# Patient Record
Sex: MALE | Race: WHITE | HISPANIC OR LATINO | Employment: FULL TIME | URBAN - METROPOLITAN AREA
[De-identification: names, ages, dates, MRNs, and addresses within clinical notes are randomized per-mention and may not be internally consistent; named-entity substitution may affect disease eponyms.]

---

## 2017-07-25 ENCOUNTER — OFFICE VISIT (OUTPATIENT)
Dept: URGENT CARE | Facility: CLINIC | Age: 27
End: 2017-07-25

## 2017-07-25 ENCOUNTER — NON-PROVIDER VISIT (OUTPATIENT)
Dept: URGENT CARE | Facility: CLINIC | Age: 27
End: 2017-07-25

## 2017-07-25 DIAGNOSIS — Z29.89 NEED FOR ISOLATION: ICD-10-CM

## 2017-07-25 DIAGNOSIS — Z01.89 RESPIRATORY CLEARANCE EXAMINATION, ENCOUNTER FOR: ICD-10-CM

## 2017-07-25 PROCEDURE — 94375 RESPIRATORY FLOW VOLUME LOOP: CPT | Performed by: PHYSICIAN ASSISTANT

## 2017-07-25 PROCEDURE — 8916 PR CLEARANCE ONLY: Performed by: PHYSICIAN ASSISTANT

## 2017-07-25 NOTE — MR AVS SNAPSHOT
Shaka Cabrera   2017 2:15 PM   Office Visit   MRN: [id number]    Department:  [facility]   Dept Phone:  [phone]    Description:  Male : 1990   Provider:  Spring Sarmiento PA-C           Reason for Visit     Other Mask Fit/ Respiratory Clearance      Allergies as of 2017     No Known Allergies      You were diagnosed with     Respiratory clearance examination, encounter for   [[id number]]         Basic Information     Date Of Birth Sex Race Ethnicity Preferred Language    1990 Male  or   Origin (Luxembourgish,Bruneian,Taiwanese,Asif, etc) English      Health Maintenance     Patient has no pending health maintenance at this time      Current Immunizations     No immunizations on file.      Below and/or attached are the medications your provider expects you to take. Review all of your home medications and newly ordered medications with your provider and/or pharmacist. Follow medication instructions as directed by your provider and/or pharmacist. Please keep your medication list with you and share with your provider. Update the information when medications are discontinued, doses are changed, or new medications (including over-the-counter products) are added; and carry medication information at all times in the event of emergency situations     Allergies:  No Known Allergies          Medications  Valid as of: 2017 -  2:44 PM    Generic Name Brand Name Tablet Size Instructions for use    Hydrocodone-Acetaminophen (Tab) VICODIN 5-500 MG Take 1-2 Tabs by mouth every four hours as needed for 20 doses.        .                 Medicines prescribed today were sent to:     None      Medication refill instructions:       If your prescription bottle indicates you have medication refills left, it is not necessary to call your provider’s office. Please contact your pharmacy and they will refill your medication.    If your prescription bottle indicates you do not  have any refills left, you may request refills at any time through one of the following ways: The online [employer]ologic system (except Urgent Care), by calling your provider’s office, or by asking your pharmacy to contact your provider’s office with a refill request. Medication refills are processed only during regular business hours and may not be available until the next business day. Your provider may request additional information or to have a follow-up visit with you prior to refilling your medication.   *Please Note: Medication refills are assigned a new Rx number when refilled electronically. Your pharmacy may indicate that no refills were authorized even though a new prescription for the same medication is available at the pharmacy. Please request the medicine by name with the pharmacy before contacting your provider for a refill.           [employer] Access Code: [email or web address]  Expires: 8/24/2017  2:44 PM    US [employer]ologic  A secure, online tool to manage your health information     [employer]’s US [employer]ologic® is a secure, online tool that connects you to your personalized health information from the privacy of your home -- day or night - making it very easy for you to manage your healthcare. Once the activation process is completed, you can even access your medical information using the US [employer]ologic dudley, which is available for free in the Apple Dudley store or Google Play store.     US [employer]ologic provides the following levels of access (as shown below):   My Chart Features   [facility]  Urgent  Care [facility]  Primary Care  Doctor   Email your healthcare team securely and privately 24/7 X X X    Manage appointments: schedule your next appointment; view details of past/upcoming appointments X      Request prescription refills. X      View recent personal medical records, including lab and immunizations X X X X   View health record, including health history, allergies, medications X X X X    Read reports about your outpatient visits, procedures, consult and ER notes X X X X   See your discharge summary, which is a recap of your hospital and/or ER visit that includes your diagnosis, lab results, and care plan. X X       How to register for WorkSimple:  1. Go to  [email or web address].  2. Click on the Sign Up Now box, which takes you to the New Member Sign Up page. You will need to provide the following information:  a. Enter your WorkSimple Access Code exactly as it appears at the top of this page. (You will not need to use this code after you’ve completed the sign-up process. If you do not sign up before the expiration date, you must request a new code.)   b. Enter your date of birth.   c. Enter your home email address.   d. Click Submit, and follow the next screen’s instructions.  3. Create a WorkSimple ID. This will be your WorkSimple login ID and cannot be changed, so think of one that is secure and easy to remember.  4. Create a WorkSimple password. You can change your password at any time.  5. Enter your Password Reset Question and Answer. This can be used at a later time if you forget your password.   6. Enter your e-mail address. This allows you to receive e-mail notifications when new information is available in WorkSimple.  7. Click Sign Up. You can now view your health information.    For assistance activating your WorkSimple account, call [phone]

## 2017-07-25 NOTE — PROGRESS NOTES
Shaka Cabrera is a 27 y.o. male here for a non-provider visit for Mask Fit/ Respiratory Clearance    If abnormal was an in office provider notified today (if so, indicate provider)? Yes  Routed to PCP? No    Respiratory Questionnaire reviewed. Patient cleared without restrictions

## 2018-03-22 ENCOUNTER — OFFICE VISIT (OUTPATIENT)
Dept: MEDICAL GROUP | Facility: PHYSICIAN GROUP | Age: 28
End: 2018-03-22
Payer: COMMERCIAL

## 2018-03-22 VITALS
WEIGHT: 264 LBS | OXYGEN SATURATION: 94 % | SYSTOLIC BLOOD PRESSURE: 138 MMHG | HEIGHT: 73 IN | HEART RATE: 82 BPM | DIASTOLIC BLOOD PRESSURE: 82 MMHG | TEMPERATURE: 97.5 F | BODY MASS INDEX: 34.99 KG/M2

## 2018-03-22 DIAGNOSIS — I10 ESSENTIAL HYPERTENSION: ICD-10-CM

## 2018-03-22 DIAGNOSIS — R06.83 SNORING: ICD-10-CM

## 2018-03-22 DIAGNOSIS — Z13.220 SCREENING FOR LIPID DISORDERS: ICD-10-CM

## 2018-03-22 DIAGNOSIS — F43.23 ADJUSTMENT REACTION WITH ANXIETY AND DEPRESSION: ICD-10-CM

## 2018-03-22 DIAGNOSIS — R06.89 DIFFICULTY BREATHING: ICD-10-CM

## 2018-03-22 DIAGNOSIS — Z76.89 ENCOUNTER TO ESTABLISH CARE: ICD-10-CM

## 2018-03-22 DIAGNOSIS — R68.89 NASAL AIRWAY ABNORMALITY: ICD-10-CM

## 2018-03-22 PROBLEM — R06.02 SHORTNESS OF BREATH: Status: ACTIVE | Noted: 2018-03-22

## 2018-03-22 PROBLEM — F41.9 ANXIETY: Status: ACTIVE | Noted: 2018-03-22

## 2018-03-22 PROCEDURE — 99214 OFFICE O/P EST MOD 30 MIN: CPT | Performed by: NURSE PRACTITIONER

## 2018-03-22 RX ORDER — FLUTICASONE PROPIONATE 50 MCG
2 SPRAY, SUSPENSION (ML) NASAL DAILY
Qty: 16 G | Refills: 0 | Status: SHIPPED | OUTPATIENT
Start: 2018-03-22 | End: 2019-11-29

## 2018-03-22 ASSESSMENT — PATIENT HEALTH QUESTIONNAIRE - PHQ9
5. POOR APPETITE OR OVEREATING: 2 - MORE THAN HALF THE DAYS
SUM OF ALL RESPONSES TO PHQ QUESTIONS 1-9: 12
CLINICAL INTERPRETATION OF PHQ2 SCORE: 4

## 2018-03-22 NOTE — PROGRESS NOTES
Chief Complaint   Patient presents with   • Weight Gain     x1year, would thyroid check   • Shortness of Breath     has trouble breathing really easy       HPI:    Shaka Cabrera is a 27 y.o. male here to establish care. Here with his s/o today     BMI 34.0-34.9,adult  States he has gained 50 lb over the past year.     Exercise: 3-4 days/week weight lifting and then with 45 minutes total cardio in a week    Nutrition: 24 hour diet recall: [employer] burrito/fries, and lunchable  Wife often meal preps for him, so he generally eats chicken, rice, veggies       Difficulty breathing  New problem. Started about 1 year ago. Feels like he cannot breathe through the left nostril, therefore then feels like he is not getting enough air. When walking upstairs he notices increased sob. I clarified with him multiple times whether he feels any difficulty getting air through lungs when breathing through mouth, or chest tightness or dyspnea, and he states no. States he feels the difficulty breathing comes from his left nare. Wife reports he does snore loudly. No hx of asthma or smoking.     Adjustment reaction with anxiety and depression  New problem started about 1 year ago. He got a new job at [employer] and started school. He is  with 2 kids at home. He has a lot going on. Will be in school for another 3-4 years at least while working full time.   He is getting headaches nearly every other day with pain in frontal head and sometime occipital region. They are occurring randomly. Sometimes he can wake up with a bad headache.     Hypertension  At physicals his bp is 130s/80. This is ongoing for him. No family hx of HTN, but his mom has kidney failure and is on dialysis    Current medicines (including changes today)  Current Outpatient Prescriptions   Medication Sig Dispense Refill   • fluticasone (FLONASE) 50 MCG/ACT nasal spray Spray 2 Sprays in nose every day. 16 g 0     No current facility-administered medications for this  "visit.      He  has no past medical history of Asthma.  He  has a past surgical history that includes debridement (08) and finger amputation (08).  Social History   Substance Use Topics   • Smoking status: Never Smoker   • Smokeless tobacco: Never Used   • Alcohol use Not on file     Social History     Social History Narrative    Works full time at [employer]. In school full time working on [employer] degree     Family History   Problem Relation Age of Onset   • Kidney Disease Mother    • Thyroid Father    • Arthritis Father      gout   • Diabetes Maternal Grandmother    • Diabetes Paternal Grandfather    • Cancer Neg Hx    • Heart Disease Neg Hx    • Stroke Neg Hx    • Hypertension Neg Hx    • Hyperlipidemia Neg Hx      Family Status   Relation Status   • Mother Alive   • Father Alive   • Maternal Grandmother    • Paternal Grandfather    • Neg Hx      Health Maintenance Topics with due status: Overdue       Topic Date Due    IMM DTaP/Tdap/Td Vaccine 2007    IMM INFLUENZA 2017        ROS  Constitutional: +fatigue and insomnia. +weight gain. No F/C, night sweats  Head/Neck: + headache. No dizziness or neck pain   Eyes: No change in vision  Nose: No discharge, sinus pain, nosebleeds, allergies  Lungs: see HPI. No cough, sob, dyspnea, chest pain with breathing, wheezing, sputum  Cardiac: No chest pain  Endo: +weight gain. No heat or cold intolerance, change in skin/hair, or excessive fatigue     Objective:     Blood pressure 138/82, pulse 82, temperature 36.4 °C (97.5 °F), height 1.854 m (6' 1\"), weight 119.7 kg (264 lb), SpO2 94 %. Body mass index is 34.83 kg/m².  Physical Exam:  Alert, oriented in no acute distress.  Eye contact is good, speech goal directed, affect bright.  HEENT: EOMI, conjunctiva non-injected, sclera non-icteric. No lid edema or eye drainage  Nares patent with no significant congestion or drainage. No deviated septum. No sign of nasal obstruction.   Gross " hearing intact   Neck: supple with no cervical or supraclavicular lymphadenopathy, palpable thyroid nodules or thyromegaly.  Lungs: unlabored, clear to auscultation bilaterally with good excursion.  CV: regular rate and rhythm, no murmurs, no carotid bruits  Lower extremities color normal, vascularity normal, no edema  Skin: No rashes or lesions in visible areas  Neuro: CNs grossly intact. Gait steady. Strength all extremities 5/5.         Assessment and Plan:   Assessment/Plan:  1. Encounter to establish care    2. Essential hypertension  New problem. Enc weight loss. Start monitoring bp weekly. F/u 6 months   - COMP METABOLIC PANEL; Future  - TSH WITH REFLEX TO FT4; Future    3. Adjustment reaction with anxiety and depression  Not controlled. Discussed stress management techniques, sleep hygiene, and enc him to notify me if this starts affecting his ADLs  - Patient has been identified as being depressed and appropriate orders and counseling have been given    4. Difficulty breathing  See #5-6. Not performing xray as concerns are clearly coming from nasal passage   - CBC WITH DIFFERENTIAL; Future    5. Snoring  Start trial of mouthpiece at night. If no improvement, consider eval with ENT d/t additional c/o nasal blockage   - REFERRAL TO ENT    6. Nasal airway abnormality  No sign of obstruction or deviated septum today, but his complaint sounds like obstruction/blockage complaints. Start trial of Flonase. If not improving, f/u with ENT  - fluticasone (FLONASE) 50 MCG/ACT nasal spray; Spray 2 Sprays in nose every day.  Dispense: 16 g; Refill: 0  - REFERRAL TO ENT    7. BMI 34.0-34.9,adult  Ongoing despite his efforts. Enc increasing cardiovascular exercise and decreasing portions   - Patient identified as having weight management issue.  Appropriate orders and counseling given.    8. Screening for lipid disorders  - LIPID PROFILE; Future       Follow up:  Return pending labs .    Educated in proper administration of  medication(s) ordered today including safety, possible SE, risks, benefits, rationale and alternatives to therapy.   Supportive care, differential diagnoses, and indications for immediate follow-up discussed with patient.    Pathogenesis of diagnosis discussed including typical length and natural progression.    Instructed to return to clinic or nearest emergency department for any change in condition, further concerns, or worsening of symptoms.  Patient states understanding of the plan of care and discharge instructions.    Please note that this dictation was created using voice recognition software. I have made every reasonable attempt to correct obvious errors, but I expect that there are errors of grammar and possibly content that I did not discover before finalizing the note.    Followup: Return pending labs . sooner should new symptoms or problems arise.

## 2018-03-24 ENCOUNTER — HOSPITAL ENCOUNTER (OUTPATIENT)
Dept: LAB | Facility: MEDICAL CENTER | Age: 28
End: 2018-03-24
Attending: NURSE PRACTITIONER
Payer: COMMERCIAL

## 2018-03-24 DIAGNOSIS — I10 ESSENTIAL HYPERTENSION: ICD-10-CM

## 2018-03-24 DIAGNOSIS — R06.89 DIFFICULTY BREATHING: ICD-10-CM

## 2018-03-24 DIAGNOSIS — Z13.220 SCREENING FOR LIPID DISORDERS: ICD-10-CM

## 2018-03-24 LAB
ALBUMIN SERPL BCP-MCNC: 4.3 G/DL (ref 3.2–4.9)
ALBUMIN/GLOB SERPL: 1.7 G/DL
ALP SERPL-CCNC: 67 U/L (ref 30–99)
ALT SERPL-CCNC: 28 U/L (ref 2–50)
ANION GAP SERPL CALC-SCNC: 8 MMOL/L (ref 0–11.9)
AST SERPL-CCNC: 20 U/L (ref 12–45)
BASOPHILS # BLD AUTO: 0.6 % (ref 0–1.8)
BASOPHILS # BLD: 0.04 K/UL (ref 0–0.12)
BILIRUB SERPL-MCNC: 0.5 MG/DL (ref 0.1–1.5)
BUN SERPL-MCNC: 14 MG/DL (ref 8–22)
CALCIUM SERPL-MCNC: 9.3 MG/DL (ref 8.5–10.5)
CHLORIDE SERPL-SCNC: 107 MMOL/L (ref 96–112)
CHOLEST SERPL-MCNC: 143 MG/DL (ref 100–199)
CO2 SERPL-SCNC: 23 MMOL/L (ref 20–33)
CREAT SERPL-MCNC: 0.77 MG/DL (ref 0.5–1.4)
EOSINOPHIL # BLD AUTO: 0.26 K/UL (ref 0–0.51)
EOSINOPHIL NFR BLD: 4 % (ref 0–6.9)
ERYTHROCYTE [DISTWIDTH] IN BLOOD BY AUTOMATED COUNT: 40.3 FL (ref 35.9–50)
GLOBULIN SER CALC-MCNC: 2.6 G/DL (ref 1.9–3.5)
GLUCOSE SERPL-MCNC: 91 MG/DL (ref 65–99)
HCT VFR BLD AUTO: 49.9 % (ref 42–52)
HDLC SERPL-MCNC: 59 MG/DL
HGB BLD-MCNC: 16.1 G/DL (ref 14–18)
IMM GRANULOCYTES # BLD AUTO: 0.02 K/UL (ref 0–0.11)
IMM GRANULOCYTES NFR BLD AUTO: 0.3 % (ref 0–0.9)
LDLC SERPL CALC-MCNC: 71 MG/DL
LYMPHOCYTES # BLD AUTO: 2.21 K/UL (ref 1–4.8)
LYMPHOCYTES NFR BLD: 33.8 % (ref 22–41)
MCH RBC QN AUTO: 28.1 PG (ref 27–33)
MCHC RBC AUTO-ENTMCNC: 32.3 G/DL (ref 33.7–35.3)
MCV RBC AUTO: 87.2 FL (ref 81.4–97.8)
MONOCYTES # BLD AUTO: 0.44 K/UL (ref 0–0.85)
MONOCYTES NFR BLD AUTO: 6.7 % (ref 0–13.4)
NEUTROPHILS # BLD AUTO: 3.57 K/UL (ref 1.82–7.42)
NEUTROPHILS NFR BLD: 54.6 % (ref 44–72)
NRBC # BLD AUTO: 0 K/UL
NRBC BLD-RTO: 0 /100 WBC
PLATELET # BLD AUTO: 197 K/UL (ref 164–446)
PMV BLD AUTO: 11.4 FL (ref 9–12.9)
POTASSIUM SERPL-SCNC: 4 MMOL/L (ref 3.6–5.5)
PROT SERPL-MCNC: 6.9 G/DL (ref 6–8.2)
RBC # BLD AUTO: 5.72 M/UL (ref 4.7–6.1)
SODIUM SERPL-SCNC: 138 MMOL/L (ref 135–145)
TRIGL SERPL-MCNC: 65 MG/DL (ref 0–149)
TSH SERPL DL<=0.005 MIU/L-ACNC: 0.91 UIU/ML (ref 0.38–5.33)
WBC # BLD AUTO: 6.5 K/UL (ref 4.8–10.8)

## 2018-03-24 PROCEDURE — 85025 COMPLETE CBC W/AUTO DIFF WBC: CPT

## 2018-03-24 PROCEDURE — 36415 COLL VENOUS BLD VENIPUNCTURE: CPT

## 2018-03-24 PROCEDURE — 84443 ASSAY THYROID STIM HORMONE: CPT

## 2018-03-24 PROCEDURE — 80061 LIPID PANEL: CPT

## 2018-03-24 PROCEDURE — 80053 COMPREHEN METABOLIC PANEL: CPT

## 2019-11-29 ENCOUNTER — OFFICE VISIT (OUTPATIENT)
Dept: URGENT CARE | Facility: PHYSICIAN GROUP | Age: 29
End: 2019-11-29
Payer: COMMERCIAL

## 2019-11-29 VITALS
BODY MASS INDEX: 33.13 KG/M2 | HEIGHT: 73 IN | DIASTOLIC BLOOD PRESSURE: 80 MMHG | OXYGEN SATURATION: 95 % | WEIGHT: 250 LBS | HEART RATE: 105 BPM | RESPIRATION RATE: 20 BRPM | TEMPERATURE: 102 F | SYSTOLIC BLOOD PRESSURE: 134 MMHG

## 2019-11-29 DIAGNOSIS — R50.9 FEVER, UNSPECIFIED FEVER CAUSE: ICD-10-CM

## 2019-11-29 DIAGNOSIS — J11.1 INFLUENZA: ICD-10-CM

## 2019-11-29 LAB
FLUAV+FLUBV AG SPEC QL IA: NORMAL
INT CON NEG: NEGATIVE
INT CON NEG: NEGATIVE
INT CON POS: POSITIVE
INT CON POS: POSITIVE
S PYO AG THROAT QL: NEGATIVE

## 2019-11-29 PROCEDURE — 99214 OFFICE O/P EST MOD 30 MIN: CPT | Performed by: FAMILY MEDICINE

## 2019-11-29 PROCEDURE — 87804 INFLUENZA ASSAY W/OPTIC: CPT | Performed by: FAMILY MEDICINE

## 2019-11-29 PROCEDURE — 87880 STREP A ASSAY W/OPTIC: CPT | Performed by: FAMILY MEDICINE

## 2019-11-29 RX ORDER — OSELTAMIVIR PHOSPHATE 75 MG/1
75 CAPSULE ORAL 2 TIMES DAILY
Qty: 10 CAP | Refills: 0 | Status: SHIPPED | OUTPATIENT
Start: 2019-11-29 | End: 2020-02-28

## 2019-11-29 RX ORDER — ACETAMINOPHEN 500 MG
1000 TABLET ORAL ONCE
Status: COMPLETED | OUTPATIENT
Start: 2019-11-29 | End: 2019-11-29

## 2019-11-29 RX ADMIN — Medication 1000 MG: at 10:00

## 2019-11-29 NOTE — PROGRESS NOTES
"Subjective:      Shaak Cabrera is a 29 y.o. male who presents with Sore Throat (fever, congestion, cough, bodyaches x2days)            This is a new problem.  29-year-old otherwise healthy non-smoker presenting with flulike symptoms including cough congestion and sore throat and body aches for the past couple days.  He also has had fever.  Use some DayQuil this morning.  No wheezing or shortness of breath reported.      Review of Systems   All other systems reviewed and are negative.         Objective:     /80 (BP Location: Right arm, Patient Position: Sitting, BP Cuff Size: Large adult)   Pulse (!) 105   Temp (!) 38.9 °C (102 °F) (Temporal)   Resp 20   Ht 1.854 m (6' 1\")   Wt 113.4 kg (250 lb)   SpO2 95%   BMI 32.98 kg/m²      Physical Exam  Constitutional:       General: He is not in acute distress.     Appearance: Normal appearance. He is not toxic-appearing or diaphoretic.   HENT:      Head: Normocephalic and atraumatic.      Right Ear: External ear normal.      Left Ear: External ear normal.      Nose: Congestion present.      Mouth/Throat:      Mouth: Mucous membranes are moist. No oral lesions.      Pharynx: Uvula midline. Posterior oropharyngeal erythema present. No oropharyngeal exudate or uvula swelling.      Tonsils: No tonsillar exudate or tonsillar abscesses.   Eyes:      Conjunctiva/sclera: Conjunctivae normal.   Neck:      Musculoskeletal: Neck supple. No muscular tenderness.   Cardiovascular:      Rate and Rhythm: Regular rhythm.      Heart sounds: No murmur. No friction rub. No gallop.    Pulmonary:      Effort: Pulmonary effort is normal. No respiratory distress.      Breath sounds: No stridor. No wheezing, rhonchi or rales.   Lymphadenopathy:      Cervical: No cervical adenopathy.   Neurological:      Mental Status: He is alert.             Results for orders placed or performed in visit on 11/29/19   POCT Influenza A/B   Result Value Ref Range    Rapid Influenza A-B Positive B     " Internal Control Positive Positive     Internal Control Negative Negative    POCT Rapid Strep A   Result Value Ref Range    Rapid Strep Screen Negative     Internal Control Positive Positive     Internal Control Negative Negative           Assessment/Plan:   ASSESSMENT:PLAN:  1. Influenza  - oseltamivir (TAMIFLU) 75 MG Cap; Take 1 Cap by mouth 2 times a day.  Dispense: 10 Cap; Refill: 0    2. Fever, unspecified fever cause  - acetaminophen (TYLENOL) tablet 1,000 mg  - POCT Influenza A/B  - POCT Rapid Strep A    Patient preferred to have Tamiflu  Continue symptomatic care  Plan per orders and instructions  Warning signs reviewed

## 2020-02-28 ENCOUNTER — OFFICE VISIT (OUTPATIENT)
Dept: MEDICAL GROUP | Facility: MEDICAL CENTER | Age: 30
End: 2020-02-28
Payer: COMMERCIAL

## 2020-02-28 VITALS
RESPIRATION RATE: 18 BRPM | BODY MASS INDEX: 33.62 KG/M2 | SYSTOLIC BLOOD PRESSURE: 110 MMHG | HEART RATE: 86 BPM | TEMPERATURE: 97.2 F | DIASTOLIC BLOOD PRESSURE: 82 MMHG | WEIGHT: 262 LBS | OXYGEN SATURATION: 96 % | HEIGHT: 74 IN

## 2020-02-28 DIAGNOSIS — Z00.00 WELL ADULT EXAM: ICD-10-CM

## 2020-02-28 DIAGNOSIS — F43.23 ADJUSTMENT REACTION WITH ANXIETY AND DEPRESSION: ICD-10-CM

## 2020-02-28 DIAGNOSIS — R09.81 NASAL CONGESTION: ICD-10-CM

## 2020-02-28 DIAGNOSIS — R06.83 SNORING: ICD-10-CM

## 2020-02-28 DIAGNOSIS — E66.9 OBESITY (BMI 30.0-34.9): ICD-10-CM

## 2020-02-28 DIAGNOSIS — G43.009 MIGRAINE WITHOUT AURA AND WITHOUT STATUS MIGRAINOSUS, NOT INTRACTABLE: ICD-10-CM

## 2020-02-28 DIAGNOSIS — N52.8 OTHER MALE ERECTILE DYSFUNCTION: ICD-10-CM

## 2020-02-28 PROBLEM — I10 HYPERTENSION: Status: RESOLVED | Noted: 2018-03-22 | Resolved: 2020-02-28

## 2020-02-28 PROBLEM — N52.1 ERECTILE DYSFUNCTION DUE TO DISEASES CLASSIFIED ELSEWHERE: Status: ACTIVE | Noted: 2020-02-28

## 2020-02-28 PROBLEM — R06.89 DIFFICULTY BREATHING: Status: RESOLVED | Noted: 2018-03-22 | Resolved: 2020-02-28

## 2020-02-28 PROBLEM — E66.811 OBESITY (BMI 30.0-34.9): Status: ACTIVE | Noted: 2020-02-28

## 2020-02-28 PROCEDURE — 99204 OFFICE O/P NEW MOD 45 MIN: CPT | Performed by: INTERNAL MEDICINE

## 2020-02-28 RX ORDER — SUMATRIPTAN 50 MG/1
50 TABLET, FILM COATED ORAL
Qty: 12 TAB | Refills: 0 | Status: SHIPPED | OUTPATIENT
Start: 2020-02-28 | End: 2022-07-06

## 2020-02-28 RX ORDER — SILDENAFIL 50 MG/1
25-50 TABLET, FILM COATED ORAL PRN
Qty: 10 TAB | Refills: 1 | Status: SHIPPED
Start: 2020-02-28 | End: 2020-03-06 | Stop reason: SDUPTHER

## 2020-02-28 RX ORDER — AZELASTINE 1 MG/ML
1 SPRAY, METERED NASAL 2 TIMES DAILY
Qty: 30 ML | Refills: 1 | Status: SHIPPED | OUTPATIENT
Start: 2020-02-28 | End: 2020-04-20

## 2020-02-28 RX ORDER — FLUTICASONE PROPIONATE 50 MCG
1 SPRAY, SUSPENSION (ML) NASAL DAILY
Qty: 16 G | Refills: 1 | Status: SHIPPED | OUTPATIENT
Start: 2020-02-28 | End: 2022-07-06

## 2020-02-28 SDOH — HEALTH STABILITY: MENTAL HEALTH: HOW MANY STANDARD DRINKS CONTAINING ALCOHOL DO YOU HAVE ON A TYPICAL DAY?: 1 OR 2

## 2020-02-28 SDOH — HEALTH STABILITY: MENTAL HEALTH: HOW OFTEN DO YOU HAVE A DRINK CONTAINING ALCOHOL?: MONTHLY OR LESS

## 2020-02-28 ASSESSMENT — PATIENT HEALTH QUESTIONNAIRE - PHQ9
5. POOR APPETITE OR OVEREATING: 1 - SEVERAL DAYS
SUM OF ALL RESPONSES TO PHQ QUESTIONS 1-9: 13
CLINICAL INTERPRETATION OF PHQ2 SCORE: 1

## 2020-02-28 NOTE — ASSESSMENT & PLAN NOTE
5-6 times a week, 45 min- 1hr 15 min.   Trying lean meats and veges, falls off wagon sometimes.   Used to weigh 290 lbs, now 262 lbs, lost over a year.

## 2020-02-28 NOTE — ASSESSMENT & PLAN NOTE
Feels congested when he walks up stairs, difficult to breathe. Denies any nasal drip, wheezing. Tried flonase for a few weeks before.  Did not notice any difference.

## 2020-02-28 NOTE — PROGRESS NOTES
New Patient to Establish      CC: Sleep apnea, obesity, migraine, erectile dysfunction    HPI:   29 y.o. male came into clinic for above.      Other male erectile dysfunction  X a few years. Still having morning erection sometimes. Most difficulty with maintaining erection. No prior injury.     Obesity (BMI 30.0-34.9)  5-6 times a week, 45 min- 1hr 15 min.   Trying lean meats and veges, falls off wagon sometimes.   Used to weigh 290 lbs, now 262 lbs, lost over a year.    Migraine without aura and without status migrainosus, not intractable  X 10 years, 1-2 / week, back of head or front bilaterally, pounding. Lasts for hours.   Drinking more water helps decrease frequency.   Light sensitivity +.  No N/V    Adjustment reaction with anxiety and depression  Currently stressed and sometimes feel depressed due to school, mother's illness. Denies SI. Managing okay most of the time.     Nasal congestion  Denies any nasal drip, wheezing. Tried flonase for a few weeks before.  Did not notice any difference.    Snoring  Wife noted apneas during sleep, irregular breathing.      ROS  GERD +.   10 systems reviewed, negative except mentioned as above.      Patient Active Problem List    Diagnosis Date Noted   • Snoring 02/28/2020   • Migraine without aura and without status migrainosus, not intractable 02/28/2020   • Other male erectile dysfunction 02/28/2020   • Obesity (BMI 30.0-34.9) 02/28/2020   • Nasal congestion 02/28/2020   • Adjustment reaction with anxiety and depression 03/22/2018       History reviewed. No pertinent past medical history.    Current Outpatient Medications   Medication Sig Dispense Refill   • fluticasone (FLONASE) 50 MCG/ACT nasal spray Spray 1 Spray in nose every day. 16 g 1   • azelastine (ASTELIN) 137 MCG/SPRAY nasal spray [place] 1 Spray in nose 2 times a day. 30 mL 1   • SUMAtriptan (IMITREX) 50 MG Tab Take 1 Tab by mouth Once PRN for Migraine (may repeat one more dose after 1 hour if no relief) for up  to 1 dose. 12 Tab 0   • sildenafil citrate (VIAGRA) 50 MG tablet Take 0.5-1 Tabs by mouth as needed for Erectile Dysfunction. 10 Tab 1     No current facility-administered medications for this visit.        Allergies as of 02/28/2020   • (No Known Allergies)       Social History     Socioeconomic History   • Marital status:      Spouse name: Not on file   • Number of children: Not on file   • Years of education: Not on file   • Highest education level: Not on file   Occupational History   • Not on file   Social Needs   • Financial resource strain: Not on file   • Food insecurity     Worry: Not on file     Inability: Not on file   • Transportation needs     Medical: Not on file     Non-medical: Not on file   Tobacco Use   • Smoking status: Never Smoker   • Smokeless tobacco: Never Used   Substance and Sexual Activity   • Alcohol use: Yes     Frequency: Monthly or less     Drinks per session: 1 or 2     Comment: 1 beer / month   • Drug use: No   • Sexual activity: Yes     Partners: Female     Comment:    Lifestyle   • Physical activity     Days per week: Not on file     Minutes per session: Not on file   • Stress: Not on file   Relationships   • Social connections     Talks on phone: Not on file     Gets together: Not on file     Attends [employer] service: Not on file     Active member of club or organization: Not on file     Attends meetings of clubs or organizations: Not on file     Relationship status: Not on file   • Intimate partner violence     Fear of current or ex partner: Not on file     Emotionally abused: Not on file     Physically abused: Not on file     Forced sexual activity: Not on file   Other Topics Concern   • Not on file   Social History Narrative    Works full time at [employer]. 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       Family History   Problem Relation Age of Onset   • Kidney Disease Mother         dialysis at age 40s   • Sleep Apnea Mother    • Thyroid Father   "  • Arthritis Father         gout   • Sleep Apnea Father    • Diabetes Father    • Psychiatric Illness Father    • Diabetes Maternal Grandmother    • Diabetes Paternal Grandfather    • Cancer Neg Hx    • Heart Disease Neg Hx    • Stroke Neg Hx    • Hypertension Neg Hx    • Hyperlipidemia Neg Hx        Past Surgical History:   Procedure Laterality Date   • DEBRIDEMENT  8/11/08    Performed by HAVEN SHIRLEY at SUR[facility]ERY [facility]S   • FINGER AMPUTATION  8/11/08    Performed by HAVEN SHIRLEY at [facility] ORS. tips of right 3,4th fingers, numb at the tips, no function loss         /82 (BP Location: Right arm, Patient Position: Sitting, BP Cuff Size: Adult)   Pulse 86   Temp 36.2 °C (97.2 °F) (Temporal)   Resp 18   Ht 1.88 m (6' 2\")   Wt 118.8 kg (262 lb)   SpO2 96%   BMI 33.64 kg/m²     Physical Exam  General: Alert and oriented, No apparent distress.  Eyes: Pupils are equal and reactive. No scleral icterus.  Throat: Clear no erythema or exudates noted.  Nasal mucosa is red and inflamed.  Neck: Supple. No cervical or supraclavicular lymphadenopathy noted. Thyroid not enlarged.  Lungs: Clear to auscultation bilaterally without any wheezing, crepitations.  Cardiovascular: Regular rate and rhythm. No murmurs, rubs or gallops.  Abdomen: Bowel sound +, soft, non tender, no rebound or guarding, no palpable organomegaly  Extremities: No clubbing, cyanosis, edema.  Skin: No rash or suspicious skin lesions noted.  Neuro: A & O x 4. Normal speech and memory. Motor and sensory grossly normal.     Assessment and Plan    1. Migraine without aura and without status migrainosus, not intractable  - trial of SUMAtriptan +/- OTC excedrin migraine.    2. Other male erectile dysfunction  Possibly related to his untreated sleep apnea and obesity. R/o hypogonadism with testosterone level since he also has fatigue and depressed mood.  -Treat symptoms currently with sildenafil, counseled side effects.     3. Snoring  - " REFERRAL TO SLEEP STUDIES    4. Obesity (BMI 30.0-34.9)  - Discussed harmful health effect of being obese in relation to above medical issues.  - Discussed about continuing healthy diet and moderate intensity exercise  - TSH WITH REFLEX TO FT4; Future  - REFERRAL TO [facility] IMPROVEMENT PROGRAMS (HIP) Services Requested: Physician Medical Weight Management Program, Registered Dietitian for Medical Nutrition Therapy, Registered Dietitian Medicare Obesity Counseling; Reason for Referral? BMI>30...    5. Nasal congestion  - REFERRAL TO ENT to r/o anatomical lesions.  - fluticasone (FLONASE) 50 MCG/ACT nasal spray; Spray 1 Spray in nose every day.  Dispense: 16 g; Refill: 1  - azelastine (ASTELIN) 137 MCG/SPRAY nasal spray; Spray 1 Spray in nose 2 times a day.  Dispense: 30 mL; Refill: 1    6. Adjustment reaction with anxiety and depression  - he declined psychology referral.  He will let us know if he experiences more severe symptoms.    7. Well adult exam  - CBC WITH DIFFERENTIAL; Future  - Comp Metabolic Panel; Future  - Lipid Profile; Future  - TSH WITH REFLEX TO FT4; Future      Followup: Return in about 3 months (around 5/28/2020) for Long (38).      Signed by: Janneth John M.D.

## 2020-02-28 NOTE — ASSESSMENT & PLAN NOTE
X 10 years, 1-2 / week, back of head or front bilaterally, pounding. Lasts for hours.   Drinking more water helps decrease frequency.   Light sensitivity +.  No N/V

## 2020-02-28 NOTE — ASSESSMENT & PLAN NOTE
X a few years. Still having morning erection sometimes. Most difficulty with maintaining erection. No prior injury.

## 2020-02-28 NOTE — ASSESSMENT & PLAN NOTE
Currently stressed and sometimes feel depressed due to school, mother's illness. Denies SI. Managing okay most of the time.

## 2020-03-07 LAB
ALBUMIN SERPL-MCNC: 4.3 G/DL (ref 4–5)
ALBUMIN/GLOB SERPL: 1.9 {RATIO} (ref 1.2–2.2)
ALP SERPL-CCNC: 74 IU/L (ref 39–117)
ALT SERPL-CCNC: 24 IU/L (ref 0–44)
AST SERPL-CCNC: 17 IU/L (ref 0–40)
BASOPHILS # BLD AUTO: 0 X10E3/UL (ref 0–0.2)
BASOPHILS NFR BLD AUTO: 0 %
BILIRUB SERPL-MCNC: 0.3 MG/DL (ref 0–1.2)
BUN SERPL-MCNC: 15 MG/DL (ref 6–20)
BUN/CREAT SERPL: 14 (ref 9–20)
CALCIUM SERPL-MCNC: 9.2 MG/DL (ref 8.7–10.2)
CHLORIDE SERPL-SCNC: 107 MMOL/L (ref 96–106)
CHOLEST SERPL-MCNC: 129 MG/DL (ref 100–199)
CO2 SERPL-SCNC: 19 MMOL/L (ref 20–29)
CREAT SERPL-MCNC: 1.08 MG/DL (ref 0.76–1.27)
EOSINOPHIL # BLD AUTO: 0.1 X10E3/UL (ref 0–0.4)
EOSINOPHIL NFR BLD AUTO: 2 %
ERYTHROCYTE [DISTWIDTH] IN BLOOD BY AUTOMATED COUNT: 13.8 % (ref 11.6–15.4)
GLOBULIN SER CALC-MCNC: 2.3 G/DL (ref 1.5–4.5)
GLUCOSE SERPL-MCNC: 95 MG/DL (ref 65–99)
HCT VFR BLD AUTO: 46 % (ref 37.5–51)
HDLC SERPL-MCNC: 54 MG/DL
HGB BLD-MCNC: 15.7 G/DL (ref 13–17.7)
IMM GRANULOCYTES # BLD AUTO: 0 X10E3/UL (ref 0–0.1)
IMM GRANULOCYTES NFR BLD AUTO: 0 %
IMMATURE CELLS  115398: NORMAL
LABORATORY COMMENT REPORT: NORMAL
LDLC SERPL CALC-MCNC: 62 MG/DL (ref 0–99)
LYMPHOCYTES # BLD AUTO: 2.3 X10E3/UL (ref 0.7–3.1)
LYMPHOCYTES NFR BLD AUTO: 38 %
MCH RBC QN AUTO: 29.6 PG (ref 26.6–33)
MCHC RBC AUTO-ENTMCNC: 34.1 G/DL (ref 31.5–35.7)
MCV RBC AUTO: 87 FL (ref 79–97)
MONOCYTES # BLD AUTO: 0.5 X10E3/UL (ref 0.1–0.9)
MONOCYTES NFR BLD AUTO: 8 %
MORPHOLOGY BLD-IMP: NORMAL
NEUTROPHILS # BLD AUTO: 3.1 X10E3/UL (ref 1.4–7)
NEUTROPHILS NFR BLD AUTO: 52 %
NRBC BLD AUTO-RTO: NORMAL %
PLATELET # BLD AUTO: 204 X10E3/UL (ref 150–450)
POTASSIUM SERPL-SCNC: 4.6 MMOL/L (ref 3.5–5.2)
PROT SERPL-MCNC: 6.6 G/DL (ref 6–8.5)
RBC # BLD AUTO: 5.31 X10E6/UL (ref 4.14–5.8)
SODIUM SERPL-SCNC: 141 MMOL/L (ref 134–144)
TESTOST SERPL-MCNC: 634 NG/DL (ref 264–916)
TRIGL SERPL-MCNC: 64 MG/DL (ref 0–149)
TSH SERPL DL<=0.005 MIU/L-ACNC: 1.41 UIU/ML (ref 0.45–4.5)
VLDLC SERPL CALC-MCNC: 13 MG/DL (ref 5–40)
WBC # BLD AUTO: 6 X10E3/UL (ref 3.4–10.8)

## 2020-04-20 ENCOUNTER — OFFICE VISIT (OUTPATIENT)
Dept: HEALTH INFORMATION MANAGEMENT | Facility: MEDICAL CENTER | Age: 30
End: 2020-04-20
Payer: COMMERCIAL

## 2020-04-20 VITALS
BODY MASS INDEX: 34.39 KG/M2 | WEIGHT: 268 LBS | HEIGHT: 74 IN | SYSTOLIC BLOOD PRESSURE: 128 MMHG | DIASTOLIC BLOOD PRESSURE: 72 MMHG | OXYGEN SATURATION: 95 % | HEART RATE: 92 BPM

## 2020-04-20 DIAGNOSIS — K21.9 GASTROESOPHAGEAL REFLUX DISEASE WITHOUT ESOPHAGITIS: ICD-10-CM

## 2020-04-20 DIAGNOSIS — R53.82 CHRONIC FATIGUE: ICD-10-CM

## 2020-04-20 DIAGNOSIS — R63.2 BINGE EATING: ICD-10-CM

## 2020-04-20 DIAGNOSIS — E66.9 OBESITY (BMI 30.0-34.9): ICD-10-CM

## 2020-04-20 DIAGNOSIS — F43.23 ADJUSTMENT DISORDER WITH MIXED ANXIETY AND DEPRESSED MOOD: ICD-10-CM

## 2020-04-20 DIAGNOSIS — R63.5 WEIGHT GAIN: ICD-10-CM

## 2020-04-20 DIAGNOSIS — G47.9 SLEEPING DIFFICULTY: ICD-10-CM

## 2020-04-20 PROCEDURE — 93000 ELECTROCARDIOGRAM COMPLETE: CPT | Performed by: INTERNAL MEDICINE

## 2020-04-20 PROCEDURE — 99205 OFFICE O/P NEW HI 60 MIN: CPT | Performed by: INTERNAL MEDICINE

## 2020-04-20 ASSESSMENT — FIBROSIS 4 INDEX: FIB4 SCORE: 0.53

## 2020-04-20 ASSESSMENT — PATIENT HEALTH QUESTIONNAIRE - PHQ9
5. POOR APPETITE OR OVEREATING: 3 - NEARLY EVERY DAY
SUM OF ALL RESPONSES TO PHQ QUESTIONS 1-9: 9
CLINICAL INTERPRETATION OF PHQ2 SCORE: 1

## 2020-04-20 NOTE — PROGRESS NOTES
"Bariatric Medicine H&P  Chief Complaint   Patient presents with   • Weight Gain       Referred by:  Janneth John M.D.    History of Present Illness:   Shaka Cabrera is a 29 y.o.  male who presents for weight management and to help address co-morbidities caused by overweight, as below.    The patient finds he has trouble losing weight.  He can never get below 230 pounds.  He stopped drinking soda, did more cardiac workouts 3 to 4 days a week, which helped him lose weight.  He has not been part of a formal weight loss program in the past.    Brief Diet History (see also RD notes):  AM: Protein shake, smoothie mixed with protein powder  Lunch: Fast food  Dinner: Fast food  Snacks: Not often  Drinks: 2 sodas daily, drinking 120 oz/d  Often skips meals    Anxiety/depression:  Has not been on antidepressant  Insomnia:  Has never had sleep eval    Behavior-Related History:  Binge eating screen: Positive  H/o abuse: None     Exercise:   Walking 3-4/d now that gym closed     Review of Systems   Positive for fatigue, lack of energy, joint pain, heartburn, cravings  Sleep apnea screen: Positive  All other ROS were reviewed with patient today and are negative.      PMH/PSH:  I have reviewed the patient's medical, social and family history, allergies, and medications today.  Prior records reviewed.  Personal Hx of Bariatric Surgery: None  Night shift at [employer]    Physical Exam:   /72 (BP Location: Left arm, Patient Position: Sitting, BP Cuff Size: Large adult)   Pulse 92   Ht 1.88 m (6' 2\")   Wt 121.6 kg (268 lb)   SpO2 95%   BMI 34.41 kg/m²   Waist Measurement   Waist: 47 inch/inches  Body fat %  35  REE  2267 kcal/day    Constitutional: Oriented to person, place, and time and well-developed, well-nourished, and in no distress.    HENT: No facial plethora.  No Cushingoid features.  No scalloped tongue.  No dental erosions.  No swollen parotids.  Head: Normocephalic.   Eyes: EOM are normal. Pupils are equal, " round, and reactive to light. No periorbital edema.  No lateral thinning of eyebrows.  No vertical nystagmus.  Neck: Normal range of motion. Neck supple. No thyromegaly present. No buffalo hump.  Cardiovascular: Normal rate and regular rhythm.  No murmur heard.  Pulmonary/Chest: Effort normal and breath sounds normal. No wheezes.   Abdominal: Soft. Bowel sounds are normal. Grade 1 pannus.  No ascites.  No hepatosplenomegaly.   Musculoskeletal: Normal range of motion. No edema.   Neurological: Alert and oriented to person, place, and time. Normal reflexes. No cranial nerve deficit. No muscle weakness.  Gait normal.   Skin: Warm and dry. Not diaphoretic.   No acanthosis nigricans.  Not excessively dry, scaly.  No acne.  No bruising/ecchymosis.  No hyperpigmentation.  No xanthomas or acrochordon.    Psychiatric: Mood, memory, affect and judgment normal.     Laboratory:   Prior labs reviewed.  EKG: Sinus rhythm, incomplete right bundle branch block, left LAFB, rate 86, corrected QT 0.432  Ordered, performed in our office today, and reviewed by me today.    Dietitian Assessment: Pending      ASSESSMENT/PLAN:  Body mass index is 34.41 kg/m².   Obesity Stage ([place]) 1; Class 1    1. Obesity (BMI 30.0-34.9)     2. Adjustment reaction with anxiety and depression     3. Weight gain     4. Binge eating     5. Gastroesophageal reflux disease without esophagitis     6. Chronic fatigue     7. Sleeping difficulty         The patient is eating very energy dense foods, suggest tracking to help reverse weight gain and binge eating.  May need anti-obesity medication.  Mood currently stable, defers use of anxiolytic or antidepressant.  GERD, fatigue should improve with weight loss.  Consider sleep eval but patient defers.    The patient and I have discussed at length and agree to the following recommendations, which are all addressing the above diagnoses:    Weight Goal: 5% wt loss at one month after start (pt goal weight is 230  lb)  Diet:     MR:  2 ND/day  High Protein/Low Carb Meals and 2 snacks between meals daily  Instruction sheets given  30% total carbs daily, around 40% protein to start, <2000 kcal/d  Track daily intake with My Fitness Pal, bring to next visit  64+ oz water per day  Avoid soda, FF  Physical Activity:  Walking 4 d/wk x 30 min ea  Risk level for moderate/vigorous exercise program:   low  New Rx:   Pt defers  Behavior change:   As above  Follow-up: one month with MD, 2 wks with RD    Face to face time spent 60 minutes,  with >50% of time devoted to one on one counseling on weight management issues, as documented above.      Patient's body mass index is 34.41 kg/m². Exercise and nutrition counseling were performed at this visit.        Thank you for your referral!

## 2022-01-27 ENCOUNTER — APPOINTMENT (OUTPATIENT)
Dept: RADIOLOGY | Facility: MEDICAL CENTER | Age: 32
End: 2022-01-27
Attending: EMERGENCY MEDICINE
Payer: COMMERCIAL

## 2022-01-27 ENCOUNTER — HOSPITAL ENCOUNTER (EMERGENCY)
Facility: MEDICAL CENTER | Age: 32
End: 2022-01-27
Attending: EMERGENCY MEDICINE
Payer: COMMERCIAL

## 2022-01-27 VITALS
BODY MASS INDEX: 34.41 KG/M2 | TEMPERATURE: 97.6 F | HEART RATE: 72 BPM | HEIGHT: 74 IN | DIASTOLIC BLOOD PRESSURE: 93 MMHG | SYSTOLIC BLOOD PRESSURE: 130 MMHG | OXYGEN SATURATION: 96 % | RESPIRATION RATE: 16 BRPM

## 2022-01-27 DIAGNOSIS — R07.9 CHEST PAIN, UNSPECIFIED TYPE: ICD-10-CM

## 2022-01-27 LAB
D DIMER PPP IA.FEU-MCNC: 0.34 UG/ML (FEU) (ref 0–0.5)
EKG IMPRESSION: NORMAL
TROPONIN T SERPL-MCNC: <6 NG/L (ref 6–19)

## 2022-01-27 PROCEDURE — 84484 ASSAY OF TROPONIN QUANT: CPT

## 2022-01-27 PROCEDURE — 85379 FIBRIN DEGRADATION QUANT: CPT

## 2022-01-27 PROCEDURE — 99283 EMERGENCY DEPT VISIT LOW MDM: CPT

## 2022-01-27 PROCEDURE — 93005 ELECTROCARDIOGRAM TRACING: CPT | Performed by: EMERGENCY MEDICINE

## 2022-01-27 PROCEDURE — 71045 X-RAY EXAM CHEST 1 VIEW: CPT

## 2022-01-27 RX ORDER — NAPROXEN 500 MG/1
500 TABLET ORAL 2 TIMES DAILY WITH MEALS
Qty: 60 TABLET | Refills: 0 | Status: SHIPPED | OUTPATIENT
Start: 2022-01-27 | End: 2022-07-06

## 2022-01-27 NOTE — ED PROVIDER NOTES
ED Provider Note    CHIEF COMPLAINT  Chief Complaint   Patient presents with   • Back Pain     1 week of right mid back pain that wraps around anterior chest.   • Chest Pain     incidentally, had covid 3 weeks ago.   • Cough       HPI  Shaka Cabrera is a 31 y.o. male who presents to the emergency department with complaint of back pain, chest pain.  He did have Covid 3 weeks ago and has been coughing significantly since that time.  He states the pain is sharp in his right side of his chest and radiates to his back, increases with deep breath and movement decreases the rest, and is intermittent not constant.  Patient denies recent fever, hemoptysis, abdominal pain, lightness, dizziness, swelling of his lower extremities.    Cardiac Risk Factors:  No Age > 65  No Aspirin use within 7 days  No prior history of coronary artery disease  No diabetes  No hyperlipidemia   No hypertension  No obesity  No family history of coronary artery disease at a young age <56 yo  No tobacco use   No drugs (methamphetamine or cocaine)  No history of aortic aneurysm   No history of aortic dissection   No history of deep vein thrombosis or pulmonary embolism     REVIEW OF SYSTEMS  Positives as above. Pertinent negatives include fever, shakes, chills, sweats, nausea, vomiting hemoptysis, recent trauma   All other 10 review of systems are negative    PAST MEDICAL HISTORY  No past medical history on file.    FAMILY HISTORY  Noncontributory    SOCIAL HISTORY  Social History     Socioeconomic History   • Marital status:      Spouse name: Not on file   • Number of children: Not on file   • Years of education: Not on file   • Highest education level: Not on file   Occupational History   • Not on file   Tobacco Use   • Smoking status: Never Smoker   • Smokeless tobacco: Never Used   Substance and Sexual Activity   • Alcohol use: Yes     Comment: 1 beer / month   • Drug use: No   • Sexual activity: Yes     Partners: Female     Comment:     Other Topics Concern   • Not on file   Social History Narrative    Works full time at [employer]. 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     Social Determinants of Health     Financial Resource Strain:    • Difficulty of Paying Living Expenses: Not on file   Food Insecurity:    • Worried About Running Out of Food in the Last Year: Not on file   • Ran Out of Food in the Last Year: Not on file   Transportation Needs:    • Lack of Transportation (Medical): Not on file   • Lack of Transportation (Non-Medical): Not on file   Physical Activity:    • Days of Exercise per Week: Not on file   • Minutes of Exercise per Session: Not on file   Stress:    • Feeling of Stress : Not on file   Social Connections:    • Frequency of Communication with Friends and Family: Not on file   • Frequency of Social Gatherings with Friends and Family: Not on file   • Attends Religion Services: Not on file   • Active Member of Clubs or Organizations: Not on file   • Attends Club or Organization Meetings: Not on file   • Marital Status: Not on file   Intimate Partner Violence:    • Fear of Current or Ex-Partner: Not on file   • Emotionally Abused: Not on file   • Physically Abused: Not on file   • Sexually Abused: Not on file   Housing Stability:    • Unable to Pay for Housing in the Last Year: Not on file   • Number of Places Lived in the Last Year: Not on file   • Unstable Housing in the Last Year: Not on file       SURGICAL HISTORY  Past Surgical History:   Procedure Laterality Date   • DEBRIDEMENT  8/11/08    Performed by HAVEN SHIRLEY at SURGERY [facility] ORS   • FINGER AMPUTATION  8/11/08    Performed by HAVEN SHIRLEY at SUR[facility] ORS. tips of right 3,4th fingers, numb at the tips, no function loss       CURRENT MEDICATIONS  Home Medications     Reviewed by Rasta Velasco R.N. (Registered Nurse) on 01/27/22 at 1246  Med List Status: Partial   Medication Last Dose Status   fluticasone (FLONASE) 50  "MCG/ACT nasal spray  Active   SUMAtriptan (IMITREX) 50 MG Tab  Active                ALLERGIES  No Known Allergies    PHYSICAL EXAM  VITAL SIGNS: /93   Pulse 72   Temp 36.4 °C (97.6 °F) (Temporal)   Resp 16   Ht 1.88 m (6' 2\")   SpO2 96%   BMI 34.41 kg/m²    Constitutional: Well developed, Well nourished, No acute distress, Non-toxic appearance.   Eyes: PERRLA, EOMI, Conjunctiva normal, No discharge.   Cardiovascular: Normal heart rate, Normal rhythm, No murmurs, No rubs, No gallops, and intact distal pulses.   Thorax & Lungs:  No respiratory distress, no rales, no rhonchi, No wheezing, No chest wall tenderness.   Abdomen: Bowel sounds normal, Soft, No tenderness, No guarding, No rebound, No pulsatile masses.   Skin: Warm, Dry, No erythema, No rash.   Extremities: Full range of motion, no deformity, no edema.  Neurologic: Alert & oriented x 3, No focal deficits noted, acting appropriately on exam.  Psychiatric: Affect normal for clinical presentation.      LABORATORY/ECG  Results for orders placed or performed during the hospital encounter of 22   TROPONIN   Result Value Ref Range    Troponin T <6 6 - 19 ng/L   D-DIMER   Result Value Ref Range    D-Dimer Screen 0.34 0.00 - 0.50 ug/mL (FEU)   EKG   Result Value Ref Range    Report       [facility] Emergency Dept.    Test Date:  2022  Pt Name:    LUCIEN STRAUSS               Department: [facility]  MRN:        [id number]                      Room:  Gender:     Male                         Technician:   :        [id number]                   Requested By:MARCOS SANDOVAL  Order #:    [id number]                    Reading MD: GINA KAPADIA, DO    Measurements  Intervals                                Axis  Rate:       67                           P:          30  NY:         140                          QRS:        -44  QRSD:       112                          T:          25  QT:         400  QTc:        423    Interpretive " Statements  SINUS RHYTHM  INCOMPLETE RBBB AND LAFB  No previous ECG available for comparison  Electronically Signed On 1- 15:32:13 PST by SAJAN ARRIOLA DO           RADIOLOGY/PROCEDURES  DX-CHEST-PORTABLE (1 VIEW)   Final Result      No evidence of acute cardiopulmonary process.            COURSE & MEDICAL DECISION MAKING  Pertinent Labs & Imaging studies reviewed. (See chart for details)  This is a 31-year-old male presents with chest discomfort.  The patient is a heart score of 0.  EKG and troponin are negative symptoms are greater than 8 hours I do believe he has acute coronary syndrome or cardiac etiology.  He did recently have a Covid and coughing significantly is concern for possible pulmonary embolism therefore D-dimer was drawn and it was negative as well.  The low pretest probability and negative D-dimer I do not believe the patient has a pulmonary pleasant.  The patient does not have clinical, historical evidence of aortic dissection, aortic aneurysm.  Do believe patient probably a strain of the costochondral region secondary to his profound coughing.  I given the patient a prescription for Naprosyn, over the counter medications for his cough have been recommended.  Strict return precautions have been given for increasing pain.      FINAL IMPRESSION     1. Chest pain, unspecified type Active     DISPOSITION:  Patient will be discharged home in stable condition.    FOLLOW UP:  [facility], Emergency Dept  [address]  [place] [id number] [phone]    If symptoms worsen    Janneth John M.D.  [address]  [address]  [facility] [id number] [phone]    Schedule an appointment as soon as possible for a visit in 1 week  As needed        Electronically signed by: Sajan Arriola D.O., 1/27/2022 3:24 PM

## 2022-01-27 NOTE — ED TRIAGE NOTES
"Chief Complaint   Patient presents with   • Back Pain     1 week of right mid back pain that wraps around anterior chest.   • Chest Pain     incidentally, had covid 3 weeks ago.   • Cough     ED Triage Vitals [01/27/22 1242]   Enc Vitals Group      Blood Pressure 141/96      Pulse 75      Respiration 18      Temperature 36.6 °C (97.8 °F)      Temp src Temporal      Pulse Oximetry 96 %      Weight       Height 1.88 m (6' 2\")     EKG done in triage. States he went to [facility] on [address] for this and was referred to ED due to abnormal EKG.  "

## 2022-01-28 NOTE — ED NOTES
PT provided with discharge paper work and follow up care. PT declines questions at this time. Pt to ambulate out of ER with S/O.

## 2022-06-08 NOTE — ASSESSMENT & PLAN NOTE
At physicals his bp is 130s/80. This is ongoing for him. No family hx of HTN, but his mom has kidney failure and is on dialysis  
New problem started about 1 year ago. He got a new job at [employer] and started school. He is  with 2 kids at home. He has a lot going on. Will be in school for another 3-4 years at least while working full time.   He is getting headaches nearly every other day with pain in frontal head and sometime occipital region. They are occurring randomly. Sometimes he can wake up with a bad headache.   
New problem. Started about 1 year ago. Feels like he cannot breathe through the left nostril, therefore then feels like he is not getting enough air. When walking upstairs he notices increased sob. I clarified with him multiple times whether he feels any difficulty getting air through lungs when breathing through mouth, or chest tightness or dyspnea, and he states no. States he feels the difficulty breathing comes from his left nare. Wife reports he does snore loudly. No hx of asthma or smoking.   
States he has gained 50 lb over the past year.     Exercise: 3-4 days/week weight lifting and then with 45 minutes total cardio in a week    Nutrition: 24 hour diet recall: [place] sumaya/friyolie, and lunchable  Wife often meal preps for him, so he generally eats chicken, rice, veggies     
Fall with Harm Risk

## 2022-07-06 ENCOUNTER — OFFICE VISIT (OUTPATIENT)
Dept: URGENT CARE | Facility: PHYSICIAN GROUP | Age: 32
End: 2022-07-06
Payer: COMMERCIAL

## 2022-07-06 VITALS
OXYGEN SATURATION: 94 % | BODY MASS INDEX: 38.04 KG/M2 | TEMPERATURE: 99 F | SYSTOLIC BLOOD PRESSURE: 130 MMHG | HEART RATE: 86 BPM | WEIGHT: 287 LBS | DIASTOLIC BLOOD PRESSURE: 84 MMHG | RESPIRATION RATE: 16 BRPM | HEIGHT: 73 IN

## 2022-07-06 DIAGNOSIS — J01.00 ACUTE NON-RECURRENT MAXILLARY SINUSITIS: ICD-10-CM

## 2022-07-06 PROCEDURE — 99213 OFFICE O/P EST LOW 20 MIN: CPT | Performed by: FAMILY MEDICINE

## 2022-07-06 RX ORDER — AMOXICILLIN AND CLAVULANATE POTASSIUM 875; 125 MG/1; MG/1
1 TABLET, FILM COATED ORAL 2 TIMES DAILY
Qty: 10 TABLET | Refills: 0 | Status: SHIPPED | OUTPATIENT
Start: 2022-07-06 | End: 2022-07-11

## 2022-07-07 NOTE — PROGRESS NOTES
"  Subjective:      32 y.o. male presents to urgent care for cold symptoms that started Wednesday.  He is experiencing ear pain, increased sinus pressure, and headache.  No associated body aches, fever, or diarrhea.  He has been using Sudafed with some good relief in symptoms. He denies any tobacco product use.  No history of asthma or COPD.  He is fully vaccinated against COVID.  He had a negative home COVID test on Saturday.  He has had no known sick contacts.    He denies any other questions or concerns at this time.    Current problem list, medication, and past medical/surgical history were reviewed in Epic.    ROS  See HPI     Objective:      /84 (BP Location: Right arm, Patient Position: Sitting, BP Cuff Size: Adult)   Pulse 86   Temp 37.2 °C (99 °F) (Temporal)   Resp 16   Ht 1.854 m (6' 1\")   Wt (!) 130 kg (287 lb)   SpO2 94%   BMI 37.87 kg/m²     Physical Exam  Constitutional:       General: He is not in acute distress.     Appearance: He is not diaphoretic.   HENT:      Right Ear: Tympanic membrane, ear canal and external ear normal.      Left Ear: Tympanic membrane, ear canal and external ear normal.      Nose:      Right Sinus: Maxillary sinus tenderness present. No frontal sinus tenderness.      Left Sinus: Maxillary sinus tenderness present. No frontal sinus tenderness.      Mouth/Throat:      Tongue: Tongue does not deviate from midline.      Palate: No lesions.      Pharynx: Uvula midline. Posterior oropharyngeal erythema present.      Tonsils: No tonsillar exudate. 1+ on the right. 1+ on the left.   Cardiovascular:      Rate and Rhythm: Normal rate and regular rhythm.      Heart sounds: Normal heart sounds.   Pulmonary:      Effort: Pulmonary effort is normal. No respiratory distress.      Breath sounds: Normal breath sounds.   Neurological:      Mental Status: He is alert.   Psychiatric:         Mood and Affect: Affect normal.         Judgment: Judgment normal.       Assessment/Plan: "     1. Acute non-recurrent maxillary sinusitis  Symptoms have been present for greater than 7 days meeting the criteria for bacterial sinusitis.  Prescription for Augmentin has been sent.  Tylenol, ibuprofen, and Flonase as needed for symptomatic relief.  - amoxicillin-clavulanate (AUGMENTIN) 875-125 MG Tab; Take 1 Tablet by mouth 2 times a day for 5 days.  Dispense: 10 Tablet; Refill: 0      Instructed to return to Urgent Care or nearest Emergency Department if symptoms fail to improve, for any change in condition, further concerns, or new concerning symptoms. Patient states understanding of the plan of care and discharge instructions.    Renetta Norris M.D.

## 2023-02-24 ENCOUNTER — PATIENT MESSAGE (OUTPATIENT)
Dept: MEDICAL GROUP | Facility: MEDICAL CENTER | Age: 33
End: 2023-02-24

## 2023-02-24 ENCOUNTER — OFFICE VISIT (OUTPATIENT)
Dept: MEDICAL GROUP | Facility: MEDICAL CENTER | Age: 33
End: 2023-02-24
Payer: COMMERCIAL

## 2023-02-24 VITALS
RESPIRATION RATE: 17 BRPM | HEART RATE: 80 BPM | TEMPERATURE: 97.9 F | DIASTOLIC BLOOD PRESSURE: 98 MMHG | HEIGHT: 74 IN | BODY MASS INDEX: 37.73 KG/M2 | WEIGHT: 294 LBS | OXYGEN SATURATION: 99 % | SYSTOLIC BLOOD PRESSURE: 128 MMHG

## 2023-02-24 DIAGNOSIS — B35.1 ONYCHOMYCOSIS OF TOENAIL: ICD-10-CM

## 2023-02-24 DIAGNOSIS — L81.9 HYPERPIGMENTATION: ICD-10-CM

## 2023-02-24 DIAGNOSIS — Z23 NEED FOR VACCINATION: ICD-10-CM

## 2023-02-24 DIAGNOSIS — Z00.00 WELL ADULT EXAM: ICD-10-CM

## 2023-02-24 DIAGNOSIS — Z11.59 NEED FOR HEPATITIS C SCREENING TEST: ICD-10-CM

## 2023-02-24 DIAGNOSIS — N52.8 OTHER MALE ERECTILE DYSFUNCTION: ICD-10-CM

## 2023-02-24 DIAGNOSIS — E66.01 CLASS 3 OBESITY (HCC): ICD-10-CM

## 2023-02-24 DIAGNOSIS — J31.0 CHRONIC RHINITIS: ICD-10-CM

## 2023-02-24 PROCEDURE — 99395 PREV VISIT EST AGE 18-39: CPT | Performed by: INTERNAL MEDICINE

## 2023-02-24 SDOH — ECONOMIC STABILITY: INCOME INSECURITY: HOW HARD IS IT FOR YOU TO PAY FOR THE VERY BASICS LIKE FOOD, HOUSING, MEDICAL CARE, AND HEATING?: NOT VERY HARD

## 2023-02-24 SDOH — ECONOMIC STABILITY: HOUSING INSECURITY
IN THE LAST 12 MONTHS, WAS THERE A TIME WHEN YOU DID NOT HAVE A STEADY PLACE TO SLEEP OR SLEPT IN A SHELTER (INCLUDING NOW)?: NO

## 2023-02-24 SDOH — ECONOMIC STABILITY: INCOME INSECURITY: IN THE LAST 12 MONTHS, WAS THERE A TIME WHEN YOU WERE NOT ABLE TO PAY THE MORTGAGE OR RENT ON TIME?: NO

## 2023-02-24 SDOH — ECONOMIC STABILITY: FOOD INSECURITY: WITHIN THE PAST 12 MONTHS, YOU WORRIED THAT YOUR FOOD WOULD RUN OUT BEFORE YOU GOT MONEY TO BUY MORE.: PATIENT DECLINED

## 2023-02-24 SDOH — ECONOMIC STABILITY: HOUSING INSECURITY: IN THE LAST 12 MONTHS, HOW MANY PLACES HAVE YOU LIVED?: 1

## 2023-02-24 SDOH — HEALTH STABILITY: PHYSICAL HEALTH: ON AVERAGE, HOW MANY MINUTES DO YOU ENGAGE IN EXERCISE AT THIS LEVEL?: 50 MIN

## 2023-02-24 SDOH — ECONOMIC STABILITY: TRANSPORTATION INSECURITY
IN THE PAST 12 MONTHS, HAS LACK OF RELIABLE TRANSPORTATION KEPT YOU FROM MEDICAL APPOINTMENTS, MEETINGS, WORK OR FROM GETTING THINGS NEEDED FOR DAILY LIVING?: PATIENT DECLINED

## 2023-02-24 SDOH — HEALTH STABILITY: MENTAL HEALTH
STRESS IS WHEN SOMEONE FEELS TENSE, NERVOUS, ANXIOUS, OR CAN'T SLEEP AT NIGHT BECAUSE THEIR MIND IS TROUBLED. HOW STRESSED ARE YOU?: TO SOME EXTENT

## 2023-02-24 SDOH — HEALTH STABILITY: PHYSICAL HEALTH: ON AVERAGE, HOW MANY DAYS PER WEEK DO YOU ENGAGE IN MODERATE TO STRENUOUS EXERCISE (LIKE A BRISK WALK)?: 3 DAYS

## 2023-02-24 SDOH — ECONOMIC STABILITY: FOOD INSECURITY: WITHIN THE PAST 12 MONTHS, THE FOOD YOU BOUGHT JUST DIDN'T LAST AND YOU DIDN'T HAVE MONEY TO GET MORE.: PATIENT DECLINED

## 2023-02-24 SDOH — ECONOMIC STABILITY: TRANSPORTATION INSECURITY
IN THE PAST 12 MONTHS, HAS THE LACK OF TRANSPORTATION KEPT YOU FROM MEDICAL APPOINTMENTS OR FROM GETTING MEDICATIONS?: PATIENT DECLINED

## 2023-02-24 SDOH — ECONOMIC STABILITY: TRANSPORTATION INSECURITY
IN THE PAST 12 MONTHS, HAS LACK OF TRANSPORTATION KEPT YOU FROM MEETINGS, WORK, OR FROM GETTING THINGS NEEDED FOR DAILY LIVING?: PATIENT DECLINED

## 2023-02-24 ASSESSMENT — LIFESTYLE VARIABLES
HOW OFTEN DO YOU HAVE SIX OR MORE DRINKS ON ONE OCCASION: LESS THAN MONTHLY
SKIP TO QUESTIONS 9-10: 0
HOW MANY STANDARD DRINKS CONTAINING ALCOHOL DO YOU HAVE ON A TYPICAL DAY: 1 OR 2
HOW OFTEN DO YOU HAVE A DRINK CONTAINING ALCOHOL: MONTHLY OR LESS
AUDIT-C TOTAL SCORE: 2

## 2023-02-24 ASSESSMENT — SOCIAL DETERMINANTS OF HEALTH (SDOH)
IN A TYPICAL WEEK, HOW MANY TIMES DO YOU TALK ON THE PHONE WITH FAMILY, FRIENDS, OR NEIGHBORS?: ONCE A WEEK
DO YOU BELONG TO ANY CLUBS OR ORGANIZATIONS SUCH AS CHURCH GROUPS UNIONS, FRATERNAL OR ATHLETIC GROUPS, OR SCHOOL GROUPS?: NO
HOW OFTEN DO YOU ATTEND CHURCH OR RELIGIOUS SERVICES?: NEVER
HOW MANY DRINKS CONTAINING ALCOHOL DO YOU HAVE ON A TYPICAL DAY WHEN YOU ARE DRINKING: 1 OR 2
WITHIN THE PAST 12 MONTHS, YOU WORRIED THAT YOUR FOOD WOULD RUN OUT BEFORE YOU GOT THE MONEY TO BUY MORE: PATIENT DECLINED
HOW HARD IS IT FOR YOU TO PAY FOR THE VERY BASICS LIKE FOOD, HOUSING, MEDICAL CARE, AND HEATING?: NOT VERY HARD
HOW OFTEN DO YOU ATTEND CHURCH OR RELIGIOUS SERVICES?: NEVER
HOW OFTEN DO YOU HAVE A DRINK CONTAINING ALCOHOL: MONTHLY OR LESS
HOW OFTEN DO YOU HAVE SIX OR MORE DRINKS ON ONE OCCASION: LESS THAN MONTHLY
IN A TYPICAL WEEK, HOW MANY TIMES DO YOU TALK ON THE PHONE WITH FAMILY, FRIENDS, OR NEIGHBORS?: ONCE A WEEK
HOW OFTEN DO YOU GET TOGETHER WITH FRIENDS OR RELATIVES?: ONCE A WEEK
DO YOU BELONG TO ANY CLUBS OR ORGANIZATIONS SUCH AS CHURCH GROUPS UNIONS, FRATERNAL OR ATHLETIC GROUPS, OR SCHOOL GROUPS?: NO
HOW OFTEN DO YOU ATTENT MEETINGS OF THE CLUB OR ORGANIZATION YOU BELONG TO?: NEVER
HOW OFTEN DO YOU ATTENT MEETINGS OF THE CLUB OR ORGANIZATION YOU BELONG TO?: NEVER
HOW OFTEN DO YOU GET TOGETHER WITH FRIENDS OR RELATIVES?: ONCE A WEEK

## 2023-02-24 ASSESSMENT — PATIENT HEALTH QUESTIONNAIRE - PHQ9: CLINICAL INTERPRETATION OF PHQ2 SCORE: 0

## 2023-02-24 NOTE — PROGRESS NOTES
Subjective:     CC:   Chief Complaint   Patient presents with    Annual Exam       HPI:   Shaka Cabrera is a 32 y.o. male who presents for annual exam    He is noticing brownish spots on the dorsal aspect of his foot bilaterally.  There is also fungal infection in toenails which is getting worse.    He is gaining weight despite of trying to lose by healthy lifestyle, eating healthier and being physically active.  He gained 30 pounds in the last 2 years.    Last Colorectal Uydm8bo Screening: NA  Last Tdap: 2002  Received HPV series: No  Hx STDs: No    Exercise: climb stairs at work.  Going to the gym.  Diet: Healthy    He  has no past medical history of Asthma.  He  has a past surgical history that includes debridement (8/11/08) and finger amputation (8/11/08).    Family History   Problem Relation Age of Onset    Kidney Disease Mother         dialysis at age 40s    Sleep Apnea Mother     Thyroid Father     Arthritis Father         gout    Sleep Apnea Father     Diabetes Father     Psychiatric Illness Father     Diabetes Maternal Grandmother     Diabetes Paternal Grandfather     Cancer Neg Hx     Heart Disease Neg Hx     Stroke Neg Hx     Hypertension Neg Hx     Hyperlipidemia Neg Hx      Social History     Tobacco Use    Smoking status: Never    Smokeless tobacco: Never   Vaping Use    Vaping Use: Never used   Substance Use Topics    Alcohol use: Yes     Comment: 1 beer / month    Drug use: No     He  reports being sexually active and has had partner(s) who are female.    Patient Active Problem List    Diagnosis Date Noted    Weight gain 04/20/2020    Binge eating 04/20/2020    Gastroesophageal reflux disease without esophagitis 04/20/2020    Chronic fatigue 04/20/2020    Sleeping difficulty 04/20/2020    Snoring 02/28/2020    Migraine without aura and without status migrainosus, not intractable 02/28/2020    Other male erectile dysfunction 02/28/2020    Obesity (BMI 30.0-34.9) 02/28/2020    Nasal congestion  "02/28/2020    Adjustment reaction with anxiety and depression 03/22/2018     No current outpatient medications on file.     No current facility-administered medications for this visit.     No Known Allergies    ROS  10 systems reviewed, negative except mentioned as above.       Objective:   BP (!) 128/98 (Patient Position: Sitting)   Pulse 80   Temp 36.6 °C (97.9 °F) (Temporal)   Resp 17   Ht 1.88 m (6' 2\")   Wt (!) 133 kg (294 lb)   SpO2 99%   BMI 37.75 kg/m²      Wt Readings from Last 4 Encounters:   02/24/23 (!) 133 kg (294 lb)   07/06/22 (!) 130 kg (287 lb)   04/20/20 122 kg (268 lb)   02/28/20 119 kg (262 lb)          Physical Exam:   Constitutional: Well-developed and well-nourished. Not diaphoretic. No distress.   Skin: Skin is warm and dry. No rash noted.  Head: Atraumatic without lesions.  Eyes: Conjunctivae and extraocular motions are normal.    Ears:  External ears unremarkable. Tympanic membranes clear and intact.  Nose: Nares occluded with nasal turbinate edema and mucus. Septum midline.    Mouth/Throat: Tongue normal. Oropharynx is clear and moist. Posterior pharynx not visible.  Mallampati is 4.  Neck: Supple, trachea midline. Normal range of motion. No thyromegaly present. No lymphadenopathy--cervical or supraclavicular.  Cardiovascular: Regular rate and rhythm, S1 and S2 without murmur, rubs, or gallops.    Respiratory: Effort normal. Clear to auscultation throughout. No adventitious sounds.   Abdomen: Soft, non tender, and without distention. Active bowel sounds in all four quadrants. No rebound, guarding, masses or HSM.  Extremities: No cyanosis, clubbing, erythema, nor edema.  Onychomycosis of all toenails.  Discrete brownish spots on the dorsum of the foot.  Neurological: Alert and oriented x 3. Grossly non-focal.    Psychiatric:  Behavior, mood, and affect are appropriate.      Assessment and Plan:     1. Well adult exam  - CBC WITH DIFFERENTIAL; Future  - Comp Metabolic Panel; Future  - " Lipid Profile; Future  - TSH WITH REFLEX TO FT4; Future  - HEMOGLOBIN A1C; Future    2. Class 3 obesity (HCC)  - HEMOGLOBIN A1C; Future  - Discussed to get calorie deficit by a mean target calorie from 3769-2774 Vamshi /day.   - Recommended pharmacological treatment at this stage.  Discussed possible benefits and side effects of phentermine.  He agrees to start.  We will send prescription after labs.  Follow-up in 1 month to recheck.    3. Chronic rhinitis  - Referral to ENT    4. Onychomycosis of toenail  We will start oral terbinafine after labs.    5. Need for hepatitis C screening test  - HEP C VIRUS ANTIBODY; Future    6. Hyperpigmentation  ?  Related to fungal infection.  Clinically monitor after treatment.    7. Need for vaccination  Tdap.  Not available today.  We will update with the next visit.    Health maintenance:      Labs per orders  Immunizations per orders  Patient counseled about skin care, diet and exercise.       Follow-up: Return in about 1 month (around 3/24/2023).

## 2023-02-25 ENCOUNTER — HOSPITAL ENCOUNTER (OUTPATIENT)
Dept: LAB | Facility: MEDICAL CENTER | Age: 33
End: 2023-02-25
Attending: INTERNAL MEDICINE
Payer: COMMERCIAL

## 2023-02-25 DIAGNOSIS — Z00.00 WELL ADULT EXAM: ICD-10-CM

## 2023-02-25 DIAGNOSIS — Z11.59 NEED FOR HEPATITIS C SCREENING TEST: ICD-10-CM

## 2023-02-25 DIAGNOSIS — E66.01 CLASS 3 OBESITY (HCC): ICD-10-CM

## 2023-02-25 LAB
ALBUMIN SERPL BCP-MCNC: 4.5 G/DL (ref 3.2–4.9)
ALBUMIN/GLOB SERPL: 1.5 G/DL
ALP SERPL-CCNC: 78 U/L (ref 30–99)
ALT SERPL-CCNC: 58 U/L (ref 2–50)
ANION GAP SERPL CALC-SCNC: 10 MMOL/L (ref 7–16)
AST SERPL-CCNC: 24 U/L (ref 12–45)
BASOPHILS # BLD AUTO: 0.7 % (ref 0–1.8)
BASOPHILS # BLD: 0.05 K/UL (ref 0–0.12)
BILIRUB SERPL-MCNC: 0.4 MG/DL (ref 0.1–1.5)
BUN SERPL-MCNC: 14 MG/DL (ref 8–22)
CALCIUM ALBUM COR SERPL-MCNC: 9 MG/DL (ref 8.5–10.5)
CALCIUM SERPL-MCNC: 9.4 MG/DL (ref 8.5–10.5)
CHLORIDE SERPL-SCNC: 106 MMOL/L (ref 96–112)
CHOLEST SERPL-MCNC: 159 MG/DL (ref 100–199)
CO2 SERPL-SCNC: 22 MMOL/L (ref 20–33)
CREAT SERPL-MCNC: 0.94 MG/DL (ref 0.5–1.4)
EOSINOPHIL # BLD AUTO: 0.17 K/UL (ref 0–0.51)
EOSINOPHIL NFR BLD: 2.4 % (ref 0–6.9)
ERYTHROCYTE [DISTWIDTH] IN BLOOD BY AUTOMATED COUNT: 39.6 FL (ref 35.9–50)
EST. AVERAGE GLUCOSE BLD GHB EST-MCNC: 131 MG/DL
FASTING STATUS PATIENT QL REPORTED: NORMAL
GFR SERPLBLD CREATININE-BSD FMLA CKD-EPI: 109 ML/MIN/1.73 M 2
GLOBULIN SER CALC-MCNC: 3 G/DL (ref 1.9–3.5)
GLUCOSE SERPL-MCNC: 103 MG/DL (ref 65–99)
HBA1C MFR BLD: 6.2 % (ref 4–5.6)
HCT VFR BLD AUTO: 49.9 % (ref 42–52)
HCV AB SER QL: NORMAL
HDLC SERPL-MCNC: 49 MG/DL
HGB BLD-MCNC: 16.6 G/DL (ref 14–18)
IMM GRANULOCYTES # BLD AUTO: 0.04 K/UL (ref 0–0.11)
IMM GRANULOCYTES NFR BLD AUTO: 0.6 % (ref 0–0.9)
LDLC SERPL CALC-MCNC: 91 MG/DL
LYMPHOCYTES # BLD AUTO: 2.03 K/UL (ref 1–4.8)
LYMPHOCYTES NFR BLD: 28.6 % (ref 22–41)
MCH RBC QN AUTO: 28.6 PG (ref 27–33)
MCHC RBC AUTO-ENTMCNC: 33.3 G/DL (ref 33.7–35.3)
MCV RBC AUTO: 85.9 FL (ref 81.4–97.8)
MONOCYTES # BLD AUTO: 0.43 K/UL (ref 0–0.85)
MONOCYTES NFR BLD AUTO: 6.1 % (ref 0–13.4)
NEUTROPHILS # BLD AUTO: 4.38 K/UL (ref 1.82–7.42)
NEUTROPHILS NFR BLD: 61.6 % (ref 44–72)
NRBC # BLD AUTO: 0 K/UL
NRBC BLD-RTO: 0 /100 WBC
PLATELET # BLD AUTO: 225 K/UL (ref 164–446)
PMV BLD AUTO: 11.2 FL (ref 9–12.9)
POTASSIUM SERPL-SCNC: 4.5 MMOL/L (ref 3.6–5.5)
PROT SERPL-MCNC: 7.5 G/DL (ref 6–8.2)
RBC # BLD AUTO: 5.81 M/UL (ref 4.7–6.1)
SODIUM SERPL-SCNC: 138 MMOL/L (ref 135–145)
TRIGL SERPL-MCNC: 95 MG/DL (ref 0–149)
TSH SERPL DL<=0.005 MIU/L-ACNC: 1.16 UIU/ML (ref 0.38–5.33)
WBC # BLD AUTO: 7.1 K/UL (ref 4.8–10.8)

## 2023-02-25 PROCEDURE — 86803 HEPATITIS C AB TEST: CPT

## 2023-02-25 PROCEDURE — 36415 COLL VENOUS BLD VENIPUNCTURE: CPT

## 2023-02-25 PROCEDURE — 80053 COMPREHEN METABOLIC PANEL: CPT

## 2023-02-25 PROCEDURE — 80061 LIPID PANEL: CPT

## 2023-02-25 PROCEDURE — 83036 HEMOGLOBIN GLYCOSYLATED A1C: CPT

## 2023-02-25 PROCEDURE — 84443 ASSAY THYROID STIM HORMONE: CPT

## 2023-02-25 PROCEDURE — 85025 COMPLETE CBC W/AUTO DIFF WBC: CPT

## 2023-02-28 RX ORDER — SILDENAFIL 50 MG/1
50 TABLET, FILM COATED ORAL
Qty: 10 TABLET | Refills: 3 | Status: SHIPPED | OUTPATIENT
Start: 2023-02-28 | End: 2023-10-20

## 2023-02-28 RX ORDER — TERBINAFINE HYDROCHLORIDE 250 MG/1
250 TABLET ORAL DAILY
Qty: 90 TABLET | Refills: 0 | Status: SHIPPED | OUTPATIENT
Start: 2023-02-28 | End: 2023-10-20

## 2023-02-28 RX ORDER — PHENTERMINE HYDROCHLORIDE 15 MG/1
15 CAPSULE ORAL EVERY MORNING
Qty: 30 CAPSULE | Refills: 0 | Status: SHIPPED | OUTPATIENT
Start: 2023-02-28 | End: 2023-03-03

## 2023-03-03 RX ORDER — PHENTERMINE HYDROCHLORIDE 37.5 MG/1
37.5 TABLET ORAL
Qty: 30 TABLET | Refills: 0 | Status: SHIPPED | OUTPATIENT
Start: 2023-03-03 | End: 2023-03-07

## 2023-03-07 RX ORDER — PHENTERMINE HYDROCHLORIDE 37.5 MG/1
37.5 TABLET ORAL
Qty: 30 TABLET | Refills: 0 | Status: SHIPPED | OUTPATIENT
Start: 2023-03-07 | End: 2023-03-24 | Stop reason: SDUPTHER

## 2023-03-24 ENCOUNTER — OFFICE VISIT (OUTPATIENT)
Dept: MEDICAL GROUP | Facility: MEDICAL CENTER | Age: 33
End: 2023-03-24
Payer: COMMERCIAL

## 2023-03-24 VITALS
WEIGHT: 292 LBS | OXYGEN SATURATION: 97 % | DIASTOLIC BLOOD PRESSURE: 80 MMHG | HEART RATE: 75 BPM | BODY MASS INDEX: 37.47 KG/M2 | TEMPERATURE: 97.6 F | HEIGHT: 74 IN | SYSTOLIC BLOOD PRESSURE: 128 MMHG | RESPIRATION RATE: 16 BRPM

## 2023-03-24 DIAGNOSIS — R73.03 PREDIABETES: ICD-10-CM

## 2023-03-24 DIAGNOSIS — R06.83 SNORING: ICD-10-CM

## 2023-03-24 DIAGNOSIS — E78.5 DYSLIPIDEMIA: ICD-10-CM

## 2023-03-24 DIAGNOSIS — E66.01 CLASS 3 OBESITY (HCC): ICD-10-CM

## 2023-03-24 PROCEDURE — 99213 OFFICE O/P EST LOW 20 MIN: CPT | Performed by: INTERNAL MEDICINE

## 2023-03-24 RX ORDER — PHENTERMINE HYDROCHLORIDE 37.5 MG/1
37.5 TABLET ORAL
Qty: 30 TABLET | Refills: 0 | Status: SHIPPED | OUTPATIENT
Start: 2023-04-05 | End: 2023-05-05 | Stop reason: SDUPTHER

## 2023-03-24 ASSESSMENT — FIBROSIS 4 INDEX: FIB4 SCORE: 0.48

## 2023-05-05 ENCOUNTER — TELEMEDICINE (OUTPATIENT)
Dept: MEDICAL GROUP | Facility: MEDICAL CENTER | Age: 33
End: 2023-05-05
Payer: COMMERCIAL

## 2023-05-05 VITALS — HEIGHT: 74 IN | WEIGHT: 278 LBS | BODY MASS INDEX: 35.68 KG/M2 | HEART RATE: 84 BPM

## 2023-05-05 DIAGNOSIS — E66.01 CLASS 3 OBESITY (HCC): ICD-10-CM

## 2023-05-05 DIAGNOSIS — E78.5 DYSLIPIDEMIA: ICD-10-CM

## 2023-05-05 DIAGNOSIS — R73.03 PREDIABETES: ICD-10-CM

## 2023-05-05 DIAGNOSIS — B35.1 ONYCHOMYCOSIS OF TOENAIL: ICD-10-CM

## 2023-05-05 PROCEDURE — 99213 OFFICE O/P EST LOW 20 MIN: CPT | Mod: 95 | Performed by: INTERNAL MEDICINE

## 2023-05-05 RX ORDER — PHENTERMINE HYDROCHLORIDE 37.5 MG/1
37.5 TABLET ORAL
Qty: 90 TABLET | Refills: 0 | Status: SHIPPED | OUTPATIENT
Start: 2023-05-05 | End: 2023-07-21 | Stop reason: SDUPTHER

## 2023-05-05 ASSESSMENT — FIBROSIS 4 INDEX: FIB4 SCORE: 0.48

## 2023-05-05 NOTE — PROGRESS NOTES
"Virtual Visit: Established Patient   This visit was conducted via Zoom using secure and encrypted videoconferencing technology. The patient was in a private location in the state of [place].    The patient's identity was confirmed and verbal consent was obtained for this virtual visit.    Subjective:   CC:   Chief Complaint   Patient presents with    Follow-Up     On medication       Shaka Cabrera is a 32 y.o. male presenting for evaluation and management of above:    He is tolerating phentermine 37.5 mg daily.  His weight was plateauing, but had great response after increasing exercise and drinking water.     His fungal toenails are better about 50%. He has one more month left to finish terbinafine.    ROS    As above    No Known Allergies    Current medicines (including changes today)  Current Outpatient Medications   Medication Sig Dispense Refill    phentermine (ADIPEX-P) 37.5 MG tablet Take 1 Tablet by mouth every morning before breakfast for 90 days. 90 Tablet 0    terbinafine (LAMISIL) 250 MG Tab Take 1 Tablet by mouth every day. 90 Tablet 0    sildenafil citrate (VIAGRA) 50 MG tablet Take 1 Tablet by mouth 1 time a day as needed for Erectile Dysfunction. 10 Tablet 3     No current facility-administered medications for this visit.       Patient Active Problem List    Diagnosis Date Noted    Prediabetes 03/24/2023    Dyslipidemia 03/24/2023    Weight gain 04/20/2020    Binge eating 04/20/2020    Gastroesophageal reflux disease without esophagitis 04/20/2020    Chronic fatigue 04/20/2020    Sleeping difficulty 04/20/2020    Snoring 02/28/2020    Migraine without aura and without status migrainosus, not intractable 02/28/2020    Other male erectile dysfunction 02/28/2020    Obesity (BMI 30.0-34.9) 02/28/2020    Nasal congestion 02/28/2020    Adjustment reaction with anxiety and depression 03/22/2018          Objective:   Pulse 84   Ht 1.88 m (6' 2\")   Wt (!) 126 kg (278 lb)   BMI 35.69 kg/m² "     Physical Exam:   Constitutional: Alert, no distress, well-groomed.  Skin: No rashes in visible areas.  Eye: Round. Conjunctiva clear, lids normal. No icterus.   ENMT: Lips pink without lesions, moist mucous membranes. Phonation normal.  Neck: No visible masses   Respiratory: Unlabored respiratory effort, no cough or audible wheeze  Psych: Alert and oriented x3, normal affect and mood.       Assessment and Plan:   The following treatment plan was discussed:     1. Prediabetes  Expected to be improving with weight loss.  Recheck in 3 months.  - HEMOGLOBIN A1C; Future    2. Dyslipidemia  Recheck in 3 months.  - Comp Metabolic Panel; Future  - Lipid Profile; Future    3. Class 3 obesity (HCC)  Getting 5% loss in 2 months, responding appropriately without side effects.   reviewed.  Refilled for 3 months and f/up in 3 months.  - phentermine (ADIPEX-P) 37.5 MG tablet; Take 1 Tablet by mouth every morning before breakfast for 90 days.  Dispense: 90 Tablet; Refill: 0    4. Onychomycosis of toenail  - finish one more month. Would not extend therapy at this point.  We will recheck when he comes in in 3 months.      Follow-up: Return in about 3 months (around 8/5/2023).

## 2023-07-21 ENCOUNTER — OFFICE VISIT (OUTPATIENT)
Dept: MEDICAL GROUP | Facility: MEDICAL CENTER | Age: 33
End: 2023-07-21
Payer: COMMERCIAL

## 2023-07-21 VITALS
HEART RATE: 90 BPM | BODY MASS INDEX: 35.42 KG/M2 | WEIGHT: 276 LBS | DIASTOLIC BLOOD PRESSURE: 86 MMHG | OXYGEN SATURATION: 96 % | HEIGHT: 74 IN | TEMPERATURE: 98.7 F | SYSTOLIC BLOOD PRESSURE: 126 MMHG | RESPIRATION RATE: 16 BRPM

## 2023-07-21 DIAGNOSIS — Z23 NEED FOR VACCINATION: ICD-10-CM

## 2023-07-21 DIAGNOSIS — E78.5 DYSLIPIDEMIA: ICD-10-CM

## 2023-07-21 DIAGNOSIS — R73.03 PREDIABETES: ICD-10-CM

## 2023-07-21 DIAGNOSIS — E66.01 CLASS 3 OBESITY (HCC): ICD-10-CM

## 2023-07-21 PROBLEM — G47.9 SLEEPING DIFFICULTY: Status: RESOLVED | Noted: 2020-04-20 | Resolved: 2023-07-21

## 2023-07-21 PROBLEM — R53.82 CHRONIC FATIGUE: Status: RESOLVED | Noted: 2020-04-20 | Resolved: 2023-07-21

## 2023-07-21 PROCEDURE — 3074F SYST BP LT 130 MM HG: CPT | Performed by: INTERNAL MEDICINE

## 2023-07-21 PROCEDURE — 99214 OFFICE O/P EST MOD 30 MIN: CPT | Performed by: INTERNAL MEDICINE

## 2023-07-21 PROCEDURE — 3079F DIAST BP 80-89 MM HG: CPT | Performed by: INTERNAL MEDICINE

## 2023-07-21 RX ORDER — PHENTERMINE HYDROCHLORIDE 37.5 MG/1
37.5 TABLET ORAL
Qty: 90 TABLET | Refills: 0 | Status: SHIPPED | OUTPATIENT
Start: 2023-07-21 | End: 2023-08-22 | Stop reason: SDUPTHER

## 2023-07-21 ASSESSMENT — FIBROSIS 4 INDEX: FIB4 SCORE: 0.49

## 2023-07-21 NOTE — PROGRESS NOTES
"    Established Patient    Shaka presents today with the following:    CC: follow-up for chronic medical problems    HPI:   Shaka is a 33 y.o. male who came in for above.    He is feeling great.  He got it down to 169 pounds a week ago.  His weight tends to be higher after gym which is like today.    He is traveling to [place] in September.  He is wondering if he needs any additional vaccination.      ROS:   As above    Patient Active Problem List    Diagnosis Date Noted    Prediabetes 03/24/2023    Dyslipidemia 03/24/2023    Weight gain 04/20/2020    Binge eating 04/20/2020    Gastroesophageal reflux disease without esophagitis 04/20/2020    Snoring 02/28/2020    Migraine without aura and without status migrainosus, not intractable 02/28/2020    Other male erectile dysfunction 02/28/2020    Obesity (BMI 30.0-34.9) 02/28/2020    Nasal congestion 02/28/2020    Adjustment reaction with anxiety and depression 03/22/2018       Current Outpatient Medications   Medication Sig Dispense Refill    phentermine (ADIPEX-P) 37.5 MG tablet Take 1 Tablet by mouth every morning before breakfast for 90 days. 90 Tablet 0    terbinafine (LAMISIL) 250 MG Tab Take 1 Tablet by mouth every day. 90 Tablet 0    sildenafil citrate (VIAGRA) 50 MG tablet Take 1 Tablet by mouth 1 time a day as needed for Erectile Dysfunction. 10 Tablet 3     No current facility-administered medications for this visit.         /86 (Patient Position: Sitting)   Pulse 90   Temp 37.1 °C (98.7 °F) (Temporal)   Resp 16   Ht 1.88 m (6' 2\")   Wt (!) 125 kg (276 lb)   SpO2 96%   BMI 35.44 kg/m²     Physical Exam  General: Alert and oriented, No apparent distress.  Neck: Supple. No cervical or supraclavicular lymphadenopathy noted. Thyroid not enlarged.  Lungs: Clear to auscultation bilaterally without any wheezing, crepitations.  Cardiovascular: Regular rate and rhythm. No murmurs, rubs or gallops.  Extremities: No edema.      Assessment and Plan    1. " Class 3 obesity (HCC)  Tolerating phentermine with appropriate weight loss.  6.7%.  Continue phentermine.  If it is plateauing, can consider additional medication. He is happy with current progress and not interested in medication addition. He is doing gym, strength exercises. He is eating healthier and smaller portion. weight is redistributed from abdominal area to muscles.  - phentermine (ADIPEX-P) 37.5 MG tablet; Take 1 Tablet by mouth every morning before breakfast for 90 days.  Dispense: 90 Tablet; Refill: 0    2. Dyslipidemia  3. Prediabetes  Recommended to do labs.    4. Need for vaccination  Recommended to update Tdap and COVID booster before traveling.      Return in about 3 months (around 10/21/2023).         Signed by: Janneth John M.D.

## 2023-08-22 DIAGNOSIS — E66.01 CLASS 3 OBESITY (HCC): ICD-10-CM

## 2023-08-28 RX ORDER — PHENTERMINE HYDROCHLORIDE 37.5 MG/1
37.5 TABLET ORAL
Qty: 90 TABLET | Refills: 0 | Status: SHIPPED | OUTPATIENT
Start: 2023-08-28 | End: 2023-08-30 | Stop reason: SDUPTHER

## 2023-08-30 DIAGNOSIS — E66.01 CLASS 3 OBESITY (HCC): ICD-10-CM

## 2023-08-30 RX ORDER — PHENTERMINE HYDROCHLORIDE 37.5 MG/1
37.5 TABLET ORAL
Qty: 90 TABLET | Refills: 0 | Status: SHIPPED | OUTPATIENT
Start: 2023-08-30 | End: 2023-09-05

## 2023-08-30 NOTE — TELEPHONE ENCOUNTER
Received request via: Pharmacy    Was the patient seen in the last year in this department? Yes    Does the patient have an active prescription (recently filled or refills available) for medication(s) requested? yes    Does the patient have [facility] and need 100 day supply (blood pressure, diabetes and cholesterol meds only)? Patient does not have SCP   Requested Prescriptions     Pending Prescriptions Disp Refills    phentermine (ADIPEX-P) 37.5 MG tablet 90 Tablet 0     Sig: Take 1 Tablet by mouth every morning before breakfast for 90 days.

## 2023-09-05 ENCOUNTER — TELEPHONE (OUTPATIENT)
Dept: MEDICAL GROUP | Facility: MEDICAL CENTER | Age: 33
End: 2023-09-05

## 2023-09-05 RX ORDER — PHENTERMINE HYDROCHLORIDE 37.5 MG/1
37.5 TABLET ORAL
Qty: 90 TABLET | Refills: 0 | Status: SHIPPED | OUTPATIENT
Start: 2023-09-05 | End: 2023-10-20 | Stop reason: SDUPTHER

## 2023-09-05 NOTE — TELEPHONE ENCOUNTER
MEDICATION PRIOR AUTHORIZATION NEEDED:    1. Name of Medication: Phentermine 37.5      2. Requested By (Name of Pharmacy): [facility]     3. Is insurance on file current? Yes     4. What is the name & phone number of the 3rd party payor? Yes via covermymeds

## 2023-10-19 ENCOUNTER — HOSPITAL ENCOUNTER (OUTPATIENT)
Dept: LAB | Facility: MEDICAL CENTER | Age: 33
End: 2023-10-19
Attending: INTERNAL MEDICINE
Payer: COMMERCIAL

## 2023-10-19 DIAGNOSIS — E78.5 DYSLIPIDEMIA: ICD-10-CM

## 2023-10-19 DIAGNOSIS — R73.03 PREDIABETES: ICD-10-CM

## 2023-10-19 LAB
ALBUMIN SERPL BCP-MCNC: 4.2 G/DL (ref 3.2–4.9)
ALBUMIN/GLOB SERPL: 1.4 G/DL
ALP SERPL-CCNC: 73 U/L (ref 30–99)
ALT SERPL-CCNC: 48 U/L (ref 2–50)
ANION GAP SERPL CALC-SCNC: 10 MMOL/L (ref 7–16)
AST SERPL-CCNC: 19 U/L (ref 12–45)
BILIRUB SERPL-MCNC: 0.4 MG/DL (ref 0.1–1.5)
BUN SERPL-MCNC: 17 MG/DL (ref 8–22)
CALCIUM ALBUM COR SERPL-MCNC: 8.9 MG/DL (ref 8.5–10.5)
CALCIUM SERPL-MCNC: 9.1 MG/DL (ref 8.5–10.5)
CHLORIDE SERPL-SCNC: 105 MMOL/L (ref 96–112)
CHOLEST SERPL-MCNC: 155 MG/DL (ref 100–199)
CO2 SERPL-SCNC: 25 MMOL/L (ref 20–33)
CREAT SERPL-MCNC: 0.79 MG/DL (ref 0.5–1.4)
EST. AVERAGE GLUCOSE BLD GHB EST-MCNC: 126 MG/DL
FASTING STATUS PATIENT QL REPORTED: NORMAL
GFR SERPLBLD CREATININE-BSD FMLA CKD-EPI: 119 ML/MIN/1.73 M 2
GLOBULIN SER CALC-MCNC: 3 G/DL (ref 1.9–3.5)
GLUCOSE SERPL-MCNC: 91 MG/DL (ref 65–99)
HBA1C MFR BLD: 6 % (ref 4–5.6)
HDLC SERPL-MCNC: 52 MG/DL
LDLC SERPL CALC-MCNC: 87 MG/DL
POTASSIUM SERPL-SCNC: 4.8 MMOL/L (ref 3.6–5.5)
PROT SERPL-MCNC: 7.2 G/DL (ref 6–8.2)
SODIUM SERPL-SCNC: 140 MMOL/L (ref 135–145)
TRIGL SERPL-MCNC: 78 MG/DL (ref 0–149)

## 2023-10-19 PROCEDURE — 83036 HEMOGLOBIN GLYCOSYLATED A1C: CPT

## 2023-10-19 PROCEDURE — 36415 COLL VENOUS BLD VENIPUNCTURE: CPT

## 2023-10-19 PROCEDURE — 80061 LIPID PANEL: CPT

## 2023-10-19 PROCEDURE — 80053 COMPREHEN METABOLIC PANEL: CPT

## 2023-10-20 ENCOUNTER — OFFICE VISIT (OUTPATIENT)
Dept: MEDICAL GROUP | Facility: MEDICAL CENTER | Age: 33
End: 2023-10-20
Payer: COMMERCIAL

## 2023-10-20 VITALS
WEIGHT: 276 LBS | BODY MASS INDEX: 35.44 KG/M2 | SYSTOLIC BLOOD PRESSURE: 120 MMHG | TEMPERATURE: 97.4 F | DIASTOLIC BLOOD PRESSURE: 62 MMHG | HEART RATE: 87 BPM | OXYGEN SATURATION: 96 %

## 2023-10-20 DIAGNOSIS — L03.039 PARONYCHIA OF GREAT TOE: ICD-10-CM

## 2023-10-20 DIAGNOSIS — R73.03 PREDIABETES: ICD-10-CM

## 2023-10-20 DIAGNOSIS — E66.01 CLASS 3 OBESITY (HCC): ICD-10-CM

## 2023-10-20 DIAGNOSIS — Z23 NEED FOR VACCINATION: ICD-10-CM

## 2023-10-20 PROCEDURE — 99214 OFFICE O/P EST MOD 30 MIN: CPT | Mod: 25 | Performed by: INTERNAL MEDICINE

## 2023-10-20 PROCEDURE — 90715 TDAP VACCINE 7 YRS/> IM: CPT | Performed by: INTERNAL MEDICINE

## 2023-10-20 PROCEDURE — 90471 IMMUNIZATION ADMIN: CPT | Performed by: INTERNAL MEDICINE

## 2023-10-20 PROCEDURE — 3074F SYST BP LT 130 MM HG: CPT | Performed by: INTERNAL MEDICINE

## 2023-10-20 PROCEDURE — 3078F DIAST BP <80 MM HG: CPT | Performed by: INTERNAL MEDICINE

## 2023-10-20 RX ORDER — IBUPROFEN 600 MG/1
TABLET ORAL
COMMUNITY
Start: 2023-07-28 | End: 2023-10-20

## 2023-10-20 RX ORDER — PHENTERMINE HYDROCHLORIDE 37.5 MG/1
37.5 TABLET ORAL
Qty: 90 TABLET | Refills: 0 | Status: SHIPPED | OUTPATIENT
Start: 2023-10-20 | End: 2023-10-20 | Stop reason: SDUPTHER

## 2023-10-20 RX ORDER — AMOXICILLIN 500 MG/1
TABLET, FILM COATED ORAL
COMMUNITY
Start: 2023-07-28 | End: 2023-10-20

## 2023-10-20 RX ORDER — AMOXICILLIN AND CLAVULANATE POTASSIUM 875; 125 MG/1; MG/1
1 TABLET, FILM COATED ORAL 2 TIMES DAILY
Qty: 10 TABLET | Refills: 0 | Status: SHIPPED | OUTPATIENT
Start: 2023-10-20 | End: 2023-10-25

## 2023-10-20 RX ORDER — PHENTERMINE HYDROCHLORIDE 37.5 MG/1
37.5 TABLET ORAL
Qty: 90 TABLET | Refills: 0 | Status: SHIPPED
Start: 2023-10-20 | End: 2024-01-10

## 2023-10-20 ASSESSMENT — FIBROSIS 4 INDEX: FIB4 SCORE: 0.43

## 2023-10-20 NOTE — PROGRESS NOTES
Established Patient    Shaka presents today with the following:    CC:  ingrown toe nail, f/up for weight management    HPI:   Shaka is a 33 y.o. male who came in for above.    He has ingrown right big toenail which he has pulled out. But the area is  and inflamed.     He ran out of phentermine 1 week ago.  He is having a hard time filling it at [employer] each time.   He did not have any further weight loss this time. He was on vacation to [place] and enjoyed food there.    ROS:   As above    Patient Active Problem List    Diagnosis Date Noted    Prediabetes 03/24/2023    Dyslipidemia 03/24/2023    Weight gain 04/20/2020    Binge eating 04/20/2020    Gastroesophageal reflux disease without esophagitis 04/20/2020    Snoring 02/28/2020    Migraine without aura and without status migrainosus, not intractable 02/28/2020    Other male erectile dysfunction 02/28/2020    Obesity (BMI 30.0-34.9) 02/28/2020    Nasal congestion 02/28/2020    Adjustment reaction with anxiety and depression 03/22/2018       Current Outpatient Medications   Medication Sig Dispense Refill    amoxicillin-clavulanate (AUGMENTIN) 875-125 MG Tab Take 1 Tablet by mouth 2 times a day for 5 days. 10 Tablet 0    mupirocin (BACTROBAN) 2 % Ointment Apply 1 Application topically 2 times a day. To R great toe x 7 days 22 g 0    phentermine (ADIPEX-P) 37.5 MG tablet Take 1 Tablet by mouth every morning before breakfast for 90 days. 90 Tablet 0     No current facility-administered medications for this visit.         /62 (BP Location: Left arm, Patient Position: Sitting, BP Cuff Size: Adult)   Pulse 87   Temp 36.3 °C (97.4 °F) (Temporal)   Resp (!) 96   Wt (!) 125 kg (276 lb)   BMI 35.44 kg/m²     Physical Exam  General: Alert and oriented, No apparent distress.  Lungs: Clear to auscultation bilaterally without any wheezing, crepitations.  Cardiovascular: Regular rate and rhythm. No murmurs, rubs or gallops.   Extremities: No edema.  R  great toe nail lateral border paronychia    Assessment and Plan    1. Paronychia of great toe  - amoxicillin-clavulanate (AUGMENTIN) 875-125 MG Tab; Take 1 Tablet by mouth 2 times a day for 5 days.  Dispense: 10 Tablet; Refill: 0  - mupirocin (BACTROBAN) 2 % Ointment; Apply 1 Application topically 2 times a day. To R great toe x 7 days  Dispense: 22 g; Refill: 0    2. Class 3 obesity (HCC)  Maintaining 6.7% loss. Continue phentermine. Metabolically better with prediabetes due to weight control.  - phentermine (ADIPEX-P) 37.5 MG tablet; Take 1 Tablet by mouth every morning before breakfast for 90 days.  Dispense: 90 Tablet; Refill: 0    3. Prediabetes  Improving.  Recheck in 6 months.    4. Need for vaccination  - Tdap =>8yo IM        Return in about 3 months (around 1/20/2024).         Signed by: Janneth John M.D.

## 2023-10-22 ENCOUNTER — PATIENT MESSAGE (OUTPATIENT)
Dept: MEDICAL GROUP | Facility: MEDICAL CENTER | Age: 33
End: 2023-10-22
Payer: COMMERCIAL

## 2023-12-15 ENCOUNTER — APPOINTMENT (OUTPATIENT)
Dept: MEDICAL GROUP | Facility: MEDICAL CENTER | Age: 33
End: 2023-12-15
Payer: COMMERCIAL

## 2024-01-10 ENCOUNTER — OFFICE VISIT (OUTPATIENT)
Dept: MEDICAL GROUP | Facility: MEDICAL CENTER | Age: 34
End: 2024-01-10
Payer: COMMERCIAL

## 2024-01-10 VITALS
TEMPERATURE: 97.9 F | DIASTOLIC BLOOD PRESSURE: 80 MMHG | WEIGHT: 283 LBS | BODY MASS INDEX: 36.32 KG/M2 | SYSTOLIC BLOOD PRESSURE: 122 MMHG | HEART RATE: 104 BPM | OXYGEN SATURATION: 94 % | HEIGHT: 74 IN

## 2024-01-10 DIAGNOSIS — E66.01 CLASS 3 OBESITY (HCC): ICD-10-CM

## 2024-01-10 DIAGNOSIS — L03.039 PARONYCHIA OF GREAT TOE: ICD-10-CM

## 2024-01-10 DIAGNOSIS — R73.03 PREDIABETES: ICD-10-CM

## 2024-01-10 DIAGNOSIS — E78.5 DYSLIPIDEMIA: ICD-10-CM

## 2024-01-10 PROCEDURE — 3079F DIAST BP 80-89 MM HG: CPT | Performed by: INTERNAL MEDICINE

## 2024-01-10 PROCEDURE — 3074F SYST BP LT 130 MM HG: CPT | Performed by: INTERNAL MEDICINE

## 2024-01-10 PROCEDURE — 99214 OFFICE O/P EST MOD 30 MIN: CPT | Performed by: INTERNAL MEDICINE

## 2024-01-10 RX ORDER — BUPROPION HYDROCHLORIDE 150 MG/1
150 TABLET ORAL EVERY MORNING
Qty: 7 TABLET | Refills: 0 | Status: SHIPPED | OUTPATIENT
Start: 2024-01-10 | End: 2024-01-17

## 2024-01-10 RX ORDER — BUPROPION HYDROCHLORIDE 300 MG/1
300 TABLET ORAL EVERY MORNING
Qty: 30 TABLET | Refills: 2 | Status: SHIPPED | OUTPATIENT
Start: 2024-01-10

## 2024-01-10 RX ORDER — NALTREXONE HYDROCHLORIDE 50 MG/1
25 TABLET, FILM COATED ORAL DAILY
Qty: 15 TABLET | Refills: 2 | Status: SHIPPED | OUTPATIENT
Start: 2024-01-10

## 2024-01-10 RX ORDER — AMOXICILLIN AND CLAVULANATE POTASSIUM 875; 125 MG/1; MG/1
1 TABLET, FILM COATED ORAL 2 TIMES DAILY
Qty: 10 TABLET | Refills: 0 | Status: SHIPPED | OUTPATIENT
Start: 2024-01-10 | End: 2024-01-15

## 2024-01-10 ASSESSMENT — FIBROSIS 4 INDEX: FIB4 SCORE: 0.43

## 2024-01-11 NOTE — PROGRESS NOTES
Established Patient    Shaka presents today with the following:    CC: Ingrown toenail, weight management    HPI:   Shaka is a 33 y.o. male who came in for above.    He continues to have recurrent issues with right great toe paronychia in both medial and lateral border.  Currently having an active episode with pus discharge.    He feels like he has developed tolerance to phentermine.  He is not getting any energy from taking it. Weight regained 7 lbs since 3 months ago.  He continues to follow healthy diet as his partner is eating healthy, portion controlled after gastric sleeve.  Less physical activity due to recurrent toe issue.    ROS:   As above    Patient Active Problem List    Diagnosis Date Noted    Prediabetes 03/24/2023    Dyslipidemia 03/24/2023    Weight gain 04/20/2020    Binge eating 04/20/2020    Gastroesophageal reflux disease without esophagitis 04/20/2020    Snoring 02/28/2020    Migraine without aura and without status migrainosus, not intractable 02/28/2020    Other male erectile dysfunction 02/28/2020    Obesity (BMI 30.0-34.9) 02/28/2020    Nasal congestion 02/28/2020    Adjustment reaction with anxiety and depression 03/22/2018       Current Outpatient Medications   Medication Sig Dispense Refill    buPROPion (WELLBUTRIN XL) 150 MG XL tablet Take 1 Tablet by mouth every morning for 7 days. 7 Tablet 0    buPROPion (WELLBUTRIN XL) 300 MG XL tablet Take 1 Tablet by mouth every morning. Start after finishing 150 mg tablets 30 Tablet 2    naltrexone (DEPADE) 50 MG Tab Take 0.5 Tablets by mouth every day. 15 Tablet 2    amoxicillin-clavulanate (AUGMENTIN) 875-125 MG Tab Take 1 Tablet by mouth 2 times a day for 5 days. 10 Tablet 0    mupirocin (BACTROBAN) 2 % Ointment Apply 1 Application topically 2 times a day. To R great toe x 7 days 22 g 0     No current facility-administered medications for this visit.         /80 (BP Location: Left arm, Patient Position: Sitting, BP Cuff Size: Adult)   " Pulse (!) 104   Temp 36.6 °C (97.9 °F) (Temporal)   Ht 1.88 m (6' 2\")   Wt (!) 128 kg (283 lb)   SpO2 94%   BMI 36.34 kg/m²     Physical Exam  General: Alert and oriented, No apparent distress.     Lungs: Clear to auscultation bilaterally without any wheezing, crepitations.  Cardiovascular: Regular rate and rhythm. No murmurs, rubs or gallops.     Extremities: No edema.      Assessment and Plan    1. Paronychia of great toe  - Referral to Podiatry  - amoxicillin-clavulanate (AUGMENTIN) 875-125 MG Tab; Take 1 Tablet by mouth 2 times a day for 5 days.  Dispense: 10 Tablet; Refill: 0  - mupirocin (BACTROBAN) 2 % Ointment; Apply 1 Application topically 2 times a day. To R great toe x 7 days  Dispense: 22 g; Refill: 0    2. Class 3 obesity (HCC)  Discussed possibly adding on metformin or topiramate to phentermine or switching completely to Wellbutrin plus naltrexone.  Since he is not getting any additional side benefit from phentermine, recommended to try switching.  Reevaluate in 3 months.  - buPROPion (WELLBUTRIN XL) 150 MG XL tablet; Take 1 Tablet by mouth every morning for 7 days.  Dispense: 7 Tablet; Refill: 0  - buPROPion (WELLBUTRIN XL) 300 MG XL tablet; Take 1 Tablet by mouth every morning. Start after finishing 150 mg tablets  Dispense: 30 Tablet; Refill: 2  - naltrexone (DEPADE) 50 MG Tab; Take 0.5 Tablets by mouth every day.  Dispense: 15 Tablet; Refill: 2    3. Prediabetes  - HEMOGLOBIN A1C; Future    4. Dyslipidemia  - Lipid Profile; Future  - Comp Metabolic Panel; Future        Return in about 3 months (around 4/10/2024).           Signed by: Janneth John M.D.    "

## 2025-03-07 ENCOUNTER — OFFICE VISIT (OUTPATIENT)
Dept: MEDICAL GROUP | Facility: PHYSICIAN GROUP | Age: 35
End: 2025-03-07
Payer: COMMERCIAL

## 2025-03-07 VITALS
TEMPERATURE: 99.2 F | OXYGEN SATURATION: 94 % | BODY MASS INDEX: 41.14 KG/M2 | SYSTOLIC BLOOD PRESSURE: 126 MMHG | DIASTOLIC BLOOD PRESSURE: 76 MMHG | HEIGHT: 73 IN | HEART RATE: 71 BPM | RESPIRATION RATE: 16 BRPM | WEIGHT: 310.41 LBS

## 2025-03-07 DIAGNOSIS — G47.19 EXCESSIVE DAYTIME SLEEPINESS: ICD-10-CM

## 2025-03-07 DIAGNOSIS — N52.8 OTHER MALE ERECTILE DYSFUNCTION: ICD-10-CM

## 2025-03-07 DIAGNOSIS — E78.5 DYSLIPIDEMIA: ICD-10-CM

## 2025-03-07 DIAGNOSIS — E66.01 MORBID OBESITY WITH BMI OF 40.0-44.9, ADULT (HCC): ICD-10-CM

## 2025-03-07 DIAGNOSIS — R06.83 SNORING: ICD-10-CM

## 2025-03-07 DIAGNOSIS — R23.9 SKIN CHANGE: ICD-10-CM

## 2025-03-07 DIAGNOSIS — R09.81 NASAL CONGESTION: ICD-10-CM

## 2025-03-07 DIAGNOSIS — R73.03 PREDIABETES: ICD-10-CM

## 2025-03-07 PROBLEM — R63.2 BINGE EATING: Status: RESOLVED | Noted: 2020-04-20 | Resolved: 2025-03-07

## 2025-03-07 PROBLEM — F43.23 ADJUSTMENT REACTION WITH ANXIETY AND DEPRESSION: Status: RESOLVED | Noted: 2018-03-22 | Resolved: 2025-03-07

## 2025-03-07 PROBLEM — R63.5 WEIGHT GAIN: Status: RESOLVED | Noted: 2020-04-20 | Resolved: 2025-03-07

## 2025-03-07 PROCEDURE — 3074F SYST BP LT 130 MM HG: CPT

## 2025-03-07 PROCEDURE — 3078F DIAST BP <80 MM HG: CPT

## 2025-03-07 PROCEDURE — 99214 OFFICE O/P EST MOD 30 MIN: CPT

## 2025-03-07 RX ORDER — SILDENAFIL 50 MG/1
50 TABLET, FILM COATED ORAL
Qty: 10 TABLET | Refills: 3 | Status: SHIPPED | OUTPATIENT
Start: 2025-03-07

## 2025-03-07 ASSESSMENT — PATIENT HEALTH QUESTIONNAIRE - PHQ9: CLINICAL INTERPRETATION OF PHQ2 SCORE: 0

## 2025-03-07 ASSESSMENT — ENCOUNTER SYMPTOMS
COUGH: 0
CONSTIPATION: 0
BACK PAIN: 0
INSOMNIA: 0
HEADACHES: 0
WEIGHT LOSS: 0
HEARTBURN: 0
DEPRESSION: 0
DIARRHEA: 0

## 2025-03-07 NOTE — ASSESSMENT & PLAN NOTE
-Chronic, controlled with diet.  Not on medication.  -Labs per orders  Orders:    Lipid Profile; Future

## 2025-03-07 NOTE — ASSESSMENT & PLAN NOTE
-Will continue to monitor weight at subsequent visits  Orders:    Patient identified as having weight management issue.  Appropriate orders and counseling given.

## 2025-03-07 NOTE — PATIENT INSTRUCTIONS
SLEEP STUDY INSTRUCTIONS    1. Our main concern is to provide the best test and evaluation of your sleep and your cooperation in following the guidelines is very necessary.    2. We have no facilities for family members or guests at the sleep center. Special arrangements will be made for children requiring overnight sleep studies.    3. Unless otherwise instructed, AVOID alcoholic beverages on the day of your sleep study.    4. DO NOT drink coffee or caffeine-containing beverages after 12:00 noon on the day of your sleep study.    5. There is NO smoking at the sleep center.    6. Try to maintain a usual daytime schedule prior to the study (avoid unusual physical activity or meals).    7. DO NOT take a nap on the day of your study.    8. This is an outpatient procedure (test); therefore, nursing services, medications, and meals ARE NOT provided. If you take medications, bring them with you and take them on the schedule you do at home.    9. Please fill your sleep aid prescription (Ambien or Lunesta) and bring to your sleep study. Even patients who normally have no problem going to sleep often need a sleep aid in this different environment.    10. We ask that you wear conventional sleep attire (pajamas or sweats) for the sleep study. We discourage patients from wearing only their underwear to bed. We recommend two-piece pajamas as the techs will need to apply sensors to your stomach.    11. Please shampoo your hair the day of the sleep study. Please DO NOT use any other hair or skin products before your arrival (e.g., mousse, gel, hair or body spray, perfume, body lotion etc.) NOTE: Women should not wear heavy makeup prior to arrival as some wires are taped to the face area.    12. The technician will be applying several small electrodes to the scalp, eye area, chin, chest, and legs, plus respiratory effort belts around the chest. Also, there will be a device placed directly under the nose. (THIS WILL NOT OBSTRUCT  YOUR BREATHING.) This is a painless procedure and the skin is not broken.    13. The test is generally completed in six to eight hours; We are usually done between 6 - 7 a.m., unless you are scheduled for a nap study. You may need to come back another night for a second study to complete your treatment plan.    14. Patients who are scheduled for an MSLT (nap study) will stay at the sleep center for the day following their nighttime study. You will be notified if a nap study was ordered for you at the time the night study is scheduled. Generally, patients having a nap study will leave the sleep center by 4 p.m.    15. You will need to bring food for the following day if you are scheduled for a nap study. A refrigerator and microwave are available.    16. A bathroom is available for your use.    17. We are able to adjust the room temperature for your comfort. Please let the technologist know if you are uncomfortable during the study.    18. If you sleep better with a special pillow or stuffed animal, you may bring it along. Service animals are the only live animals permitted.    19. Cable T.V. is available.    20. You will be scheduled for a follow-up appointment three to five days after the sleep study to review your results.    21. A copy of your sleep study is sent to the referring physician approximately two weeks after your study.    22. Any questions can be directed to our staff at [phone].    23. If CPAP therapy is applied, a home unit will be ordered for you through the [employer] company. You will be contacted to schedule delivery after insurance authorization.

## 2025-03-07 NOTE — ASSESSMENT & PLAN NOTE
-Chronic, has improved previously with some weight loss however patient has gained some weight back and states he would like a refill of this prescription.  -Rx sent for Viagra 50 mg  Orders:    sildenafil citrate (VIAGRA) 50 MG tablet; Take 1 Tablet by mouth 1 time a day as needed for Erectile Dysfunction.

## 2025-03-07 NOTE — ASSESSMENT & PLAN NOTE
-Chronic, not associated with allergies.  Medication has been unhelpful.  -X-ray order submitted today  Orders:    DX-SINUSES-PARANASAL COMPLETE 3+; Future

## 2025-03-07 NOTE — ASSESSMENT & PLAN NOTE
-Chronic, does contribute to daytime sleepiness and disturbs his wife during sleep.  Not currently on CPAP has not been evaluated for sleep apnea prior.  -Stop bang score is 6  -Sleep study ordered today.  -Referral to pulmonary sleep medicine submitted.  Orders:    Overnight Home Sleep Study; Future    Referral to Pulmonary and Sleep Medicine

## 2025-03-07 NOTE — PROGRESS NOTES
Subjective:     CC:  Diagnoses of Adjustment reaction with anxiety and depression, Snoring, Nasal congestion, Prediabetes, Dyslipidemia, Other male erectile dysfunction, Skin change, Morbid obesity with BMI of 40.0-44.9, adult (HCC), and Excessive daytime sleepiness were pertinent to this visit.    HISTORY OF THE PRESENT ILLNESS: Patient is a 34 y.o. male. This pleasant patient is here today to establish care and discuss healthcare maintenance and lab work.     Problem   Morbid Obesity With Bmi of 40.0-44.9, Adult (Hcc)   Skin Change    For about 2 years he has noticed some dark spots on his feet. They are getting more numerous, they ar non-painful. Some have popped up on his ankles. They pop up and don't go away.     Prediabetes    Most recent hemoglobin A1c = 6.0% (10/2023)     Dyslipidemia    Risk-Enhancing Factor(s): None  Clinical ASCVD: No  LDL >190: No  ASCVD risk: 0.6%    Current med: None, controlled with diet    Lab Results   Component Value Date/Time    CHOLSTRLTOT 155 10/19/2023 0617    TRIGLYCERIDE 78 10/19/2023 0617    HDL 52 10/19/2023 0617    LDL 87 10/19/2023 0617        Gastroesophageal Reflux Disease Without Esophagitis    Gets 3-4 time weekly  Tumbs are helpful  Pasta/tomato red sauce, pizza, and spicy things are triggers. Does ocassionaly disturb his sleep around once a week     Snoring    Snore loudly  Observed stoppage of breathing  Feels tired in the day time  BMI 40  Neck circumfrance >40  Stop bang score = 6     Other Male Erectile Dysfunction    Has taken sildenfil in the past that work well     Nasal Congestion    Always conjested, uses saline patrica-pot that sometimes is helpful. Nasal steroid not helpful. Physical exam suspicious possible. XR paranasal pending     Weight Gain (Resolved)   Binge Eating (Resolved)   Adjustment Reaction With Anxiety and Depression (Resolved)       Health Maintenance: Completed    ROS:   Review of Systems   Constitutional:  Negative for weight loss.  "  Respiratory:  Negative for cough.    Cardiovascular:  Negative for leg swelling.   Gastrointestinal:  Negative for constipation, diarrhea and heartburn.   Genitourinary:  Negative for dysuria, frequency and urgency.   Musculoskeletal:  Negative for back pain.   Neurological:  Negative for headaches.   Psychiatric/Behavioral:  Negative for depression. The patient does not have insomnia.          Objective:     Exam: /76   Pulse 71   Temp 37.3 °C (99.2 °F) (Temporal)   Resp 16   Ht 1.854 m (6' 1\")   Wt (!) 141 kg (310 lb 6.5 oz)   SpO2 94%  Body mass index is 40.95 kg/m².    Physical Exam  Constitutional:       Appearance: Normal appearance.   HENT:      Head: Normocephalic and atraumatic.      Right Ear: Tympanic membrane and ear canal normal. There is no impacted cerumen.      Left Ear: Tympanic membrane and ear canal normal. There is no impacted cerumen.      Mouth/Throat:      Mouth: Mucous membranes are moist.      Pharynx: Oropharynx is clear.   Eyes:      Conjunctiva/sclera: Conjunctivae normal.      Pupils: Pupils are equal, round, and reactive to light.   Cardiovascular:      Rate and Rhythm: Normal rate and regular rhythm.      Heart sounds: No murmur heard.  Pulmonary:      Effort: Pulmonary effort is normal. No respiratory distress.      Breath sounds: Normal breath sounds. No wheezing.   Abdominal:      General: There is no distension.      Tenderness: There is no abdominal tenderness.   Musculoskeletal:         General: No swelling. Normal range of motion.      Cervical back: Normal range of motion.      Right lower leg: No edema.      Left lower leg: No edema.   Skin:     General: Skin is warm and dry.      Capillary Refill: Capillary refill takes less than 2 seconds.      Findings: No rash.      Comments: On the bilateral feet there is some spots of hyperpigmentation, they are nontender, nonerythematous, nonraised.   Neurological:      General: No focal deficit present.      Mental " Status: He is alert and oriented to person, place, and time.      Deep Tendon Reflexes: Reflexes normal.   Psychiatric:         Mood and Affect: Mood normal.             STOP-Bang Score: 6      Assessment & Plan:   34 y.o. male with the following -    Assessment & Plan  Snoring  -Chronic, does contribute to daytime sleepiness and disturbs his wife during sleep.  Not currently on CPAP has not been evaluated for sleep apnea prior.  -Stop bang score is 6  -Sleep study ordered today.  -Referral to pulmonary sleep medicine submitted.  Orders:    Overnight Home Sleep Study; Future    Referral to Pulmonary and Sleep Medicine    Nasal congestion  -Chronic, not associated with allergies.  Medication has been unhelpful.  -X-ray order submitted today  Orders:    DX-SINUSES-PARANASAL COMPLETE 3+; Future    Prediabetes  -Most recent hemoglobin A1c is 6.0%.  -Labs per orders  Orders:    HEMOGLOBIN A1C; Future    Dyslipidemia  -Chronic, controlled with diet.  Not on medication.  -Labs per orders  Orders:    Lipid Profile; Future    Other male erectile dysfunction  -Chronic, has improved previously with some weight loss however patient has gained some weight back and states he would like a refill of this prescription.  -Rx sent for Viagra 50 mg  Orders:    sildenafil citrate (VIAGRA) 50 MG tablet; Take 1 Tablet by mouth 1 time a day as needed for Erectile Dysfunction.    Skin change  -Chronic, has occurred over the last 2 years.  -Counseled patient on the potential of skin pigment changes related to age, prediabetes/diabetes, and family history.       Excessive daytime sleepiness  -Chronic, uncontrolled  -Labs, studies, referrals per orders.  Orders:    Overnight Home Sleep Study; Future    Referral to Pulmonary and Sleep Medicine    CBC WITH DIFFERENTIAL; Future    Comp Metabolic Panel; Future    VITAMIN D,25 HYDROXY (DEFICIENCY); Future    TSH WITH REFLEX TO FT4; Future    Morbid obesity with BMI of 40.0-44.9, adult (HCC)  -Will  continue to monitor weight at subsequent visits  Orders:    Patient identified as having weight management issue.  Appropriate orders and counseling given.      Return in about 3 years (around 3/7/2028) for f/u sleep apnea.    Please note that this dictation was created using voice recognition software. I have made every reasonable attempt to correct obvious errors, but I expect that there are errors of grammar and possibly content that I did not discover before finalizing the note.        Isaias John D.O.

## 2025-03-11 NOTE — Clinical Note
REFERRAL APPROVAL NOTICE         Sent on March 11, 2025                   Shaka Cabrera  [address] [facility] [address]                   Dear Mr. Cabrera,    After a careful review of the medical information and benefit coverage, [facility] has processed your referral. See below for additional details.    If applicable, you must be actively enrolled with your insurance for coverage of the authorized service. If you have any questions regarding your coverage, please contact your insurance directly.    REFERRAL INFORMATION   Referral #:  [id number]  Referred-To Department    Referred-By Provider:  Pulmonary and Sleep Medicine    Isaias John D.O.   [facility]      [address]  [facility] [address]  [phone] [address]  [facility] [id number] [phone]    Referral Start Date:  03/07/2025  Referral End Date:   03/07/2026             SCHEDULING  If you do not already have an appointment, please call [phone] to make an appointment.     MORE INFORMATION  If you do not already have a [employer] account, sign up at: [email or web address]  You can access your medical information, make appointments, see lab results, billing information, and more.  If you have questions regarding this referral, please contact  the [facility] Referrals department at:             [phone]. Monday - Friday 8:00AM - 5:00PM.     Sincerely,    [facility]

## 2025-03-12 NOTE — Clinical Note
REFERRAL APPROVAL NOTICE         Sent on March 12, 2025                   Shaka Cabrera  [address]  [facility] [address]                   Dear Mr. Cabrera,    After a careful review of the medical information and benefit coverage, [facility] has processed your referral. See below for additional details.    If applicable, you must be actively enrolled with your insurance for coverage of the authorized service. If you have any questions regarding your coverage, please contact your insurance directly.    REFERRAL INFORMATION   Referral #:  [id number]  Referred-To Department    Referred-By Provider:  Pulmonary and Sleep Medicine    Isaias John D.O.   Pulmonary/sleep [facility]      [address]  [facility] [address] [phone] [address]  [facility] [address] [phone]    Referral Start Date:  03/07/2025  Referral End Date:   03/07/2026           SCHEDULING  If you do not already have an appointment, please call [phone] to make an appointment.   MORE INFORMATION  As a reminder, [facility] ownership has changed, meaning this location is now owned and operated by [facility]. As such, we want to clarify that our patients should expect to receive two separate bills for the services received at [facility] - one representing the [facility] facility fees as the owner of the establishment, and the other to represent the physician's services and subsequent fees. You can speak with your insurance carrier for a pricing estimate by calling the customer service number on the back of your card and ask about charges for a hospital outpatient visit.  If you do not already have a [employer] account, sign up at: [email or web address]  You can access your medical information, make appointments, see lab results, billing  information, and more.  If you have questions regarding this referral, please contact  the [facility] department at:             [phone]. Monday - Friday 7:30AM - 5:00PM.      Sincerely,  [facility]

## 2025-03-28 ENCOUNTER — TELEPHONE (OUTPATIENT)
Dept: HEALTH INFORMATION MANAGEMENT | Facility: OTHER | Age: 35
End: 2025-03-28
Payer: COMMERCIAL

## 2025-04-03 ENCOUNTER — APPOINTMENT (OUTPATIENT)
Dept: RADIOLOGY | Facility: MEDICAL CENTER | Age: 35
End: 2025-04-03
Payer: COMMERCIAL

## 2025-06-24 ENCOUNTER — APPOINTMENT (OUTPATIENT)
Dept: URGENT CARE | Facility: PHYSICIAN GROUP | Age: 35
End: 2025-06-24
Payer: COMMERCIAL

## 2025-07-14 ENCOUNTER — OFFICE VISIT (OUTPATIENT)
Dept: URGENT CARE | Facility: PHYSICIAN GROUP | Age: 35
End: 2025-07-14
Payer: COMMERCIAL

## 2025-07-14 VITALS
HEIGHT: 73 IN | TEMPERATURE: 97.7 F | BODY MASS INDEX: 40.93 KG/M2 | RESPIRATION RATE: 22 BRPM | OXYGEN SATURATION: 96 % | DIASTOLIC BLOOD PRESSURE: 90 MMHG | HEART RATE: 83 BPM | SYSTOLIC BLOOD PRESSURE: 128 MMHG | WEIGHT: 308.86 LBS

## 2025-07-14 DIAGNOSIS — J06.9 VIRAL UPPER RESPIRATORY ILLNESS: ICD-10-CM

## 2025-07-14 DIAGNOSIS — J01.10 ACUTE NON-RECURRENT FRONTAL SINUSITIS: Primary | ICD-10-CM

## 2025-07-14 DIAGNOSIS — H92.02 OTALGIA OF LEFT EAR: ICD-10-CM

## 2025-07-14 LAB
FLUAV RNA SPEC QL NAA+PROBE: NEGATIVE
FLUBV RNA SPEC QL NAA+PROBE: NEGATIVE
RSV RNA SPEC QL NAA+PROBE: NEGATIVE
SARS-COV-2 RNA RESP QL NAA+PROBE: NEGATIVE

## 2025-07-14 PROCEDURE — 87637 SARSCOV2&INF A&B&RSV AMP PRB: CPT

## 2025-07-14 PROCEDURE — 3074F SYST BP LT 130 MM HG: CPT

## 2025-07-14 PROCEDURE — 3080F DIAST BP >= 90 MM HG: CPT

## 2025-07-14 PROCEDURE — 99214 OFFICE O/P EST MOD 30 MIN: CPT

## 2025-07-14 RX ORDER — FLUTICASONE PROPIONATE 50 MCG
1 SPRAY, SUSPENSION (ML) NASAL DAILY
Qty: 16 G | Refills: 0 | Status: SHIPPED | OUTPATIENT
Start: 2025-07-14

## 2025-07-14 RX ORDER — GUAIFENESIN 600 MG/1
600 TABLET, EXTENDED RELEASE ORAL EVERY 12 HOURS
Qty: 28 TABLET | Refills: 0 | Status: SHIPPED | OUTPATIENT
Start: 2025-07-14 | End: 2025-07-28

## 2025-07-14 ASSESSMENT — ENCOUNTER SYMPTOMS
DIARRHEA: 0
BLOOD IN STOOL: 0
EYE REDNESS: 0
CONSTIPATION: 0
SINUS PAIN: 1
WHEEZING: 0
EYE DISCHARGE: 0
COUGH: 0
FEVER: 0
VOMITING: 0
BRUISES/BLEEDS EASILY: 0

## 2025-07-15 NOTE — PROGRESS NOTES
Subjective:     Shaka Cabrera is a 35 y.o. male who presents for Ear Fullness (Left ear, slight ear pain  x 1 week. Along with sinus pressure)      Ear Fullness  This is a new problem. Associated symptoms include congestion. Pertinent negatives include no coughing, fever, rash or vomiting.       Review of Systems   Constitutional:  Negative for fever.   HENT:  Positive for congestion, ear pain and sinus pain. Negative for ear discharge.    Eyes:  Negative for discharge and redness.   Respiratory:  Negative for cough and wheezing.    Gastrointestinal:  Negative for blood in stool, constipation, diarrhea and vomiting.   Genitourinary:  Negative for frequency and hematuria.   Skin:  Negative for itching and rash.   Endo/Heme/Allergies:  Does not bruise/bleed easily.        CURRENT MEDICATIONS:  sildenafil citrate    Allergies:   Allergies[1]    Current Problems: Shaka Cabrera does not have any pertinent problems on file.  Past Surgical Hx:    Past Surgical History:   Procedure Laterality Date    DEBRIDEMENT  8/11/08    Performed by HAVEN SHIRLEY at [facility]    FINGER AMPUTATION  8/11/08    Performed by HAVEN SHIRLEY at [facility]. tips of right 3,4th fingers, numb at the tips, no function loss      Past Social Hx:  reports that he has never smoked. He has never used smokeless tobacco. He reports current alcohol use. He reports that he does not use drugs.   Past Family Hx:  Shaka Cabrera family history includes Arthritis in his father; Colon Cancer (age of onset: 86) in his paternal grandfather; Diabetes in his father, maternal grandmother, and paternal grandfather; Heart Disease in his mother; Kidney Disease in his mother; Kidney stones in his brother; Psychiatric Illness in his father; Sleep Apnea in his brother, father, and mother; Thyroid in his father; Uterine cancer in his paternal grandmother.     (Allergies, Medications, & Tobacco/Substance Use were reconciled  "by the Medical Assistant and reviewed by myself. The family history is prepopulated)       Objective:     BP (!) 128/90 (BP Location: Left arm, Patient Position: Sitting, BP Cuff Size: Adult long)   Pulse 83   Temp 36.5 °C (97.7 °F) (Temporal)   Resp (!) 22   Ht 1.854 m (6' 1\")   Wt (!) 140 kg (308 lb 13.8 oz)   SpO2 96%   BMI 40.75 kg/m²     Physical Exam  Constitutional:       General: He is not in acute distress.     Appearance: Normal appearance. He is not ill-appearing or toxic-appearing.   HENT:      Head: Normocephalic and atraumatic.      Right Ear: Tympanic membrane, ear canal and external ear normal. There is no impacted cerumen.      Left Ear: Tympanic membrane, ear canal and external ear normal. There is no impacted cerumen.      Nose: No congestion or rhinorrhea.   Eyes:      General: No scleral icterus.        Right eye: No discharge.         Left eye: No discharge.   Cardiovascular:      Rate and Rhythm: Normal rate and regular rhythm.      Heart sounds: Normal heart sounds. No murmur heard.     No friction rub. No gallop.   Pulmonary:      Effort: Pulmonary effort is normal. No respiratory distress.      Breath sounds: Normal breath sounds.   Abdominal:      General: Abdomen is flat.      Palpations: Abdomen is soft.   Musculoskeletal:         General: Normal range of motion.      Cervical back: Normal range of motion.   Neurological:      General: No focal deficit present.      Mental Status: He is alert and oriented to person, place, and time. Mental status is at baseline.   Psychiatric:         Behavior: Behavior normal.         Judgment: Judgment normal.         Assessment/Plan:     Shaka was seen today for ear fullness.    Diagnoses and all orders for this visit:    Acute non-recurrent frontal sinusitis  -     fluticasone (FLONASE) 50 MCG/ACT nasal spray; Administer 1 Spray into affected nostril(S) every day.  -     guaiFENesin ER (MUCINEX) 600 MG TABLET SR 12 HR; Take 1 Tablet by mouth " every 12 hours for 14 days.    Viral upper respiratory illness    Otalgia of left ear  -     POCT CoV-2, Flu A/B, RSV by PCR  -     fluticasone (FLONASE) 50 MCG/ACT nasal spray; Administer 1 Spray into affected nostril(S) every day.  -     guaiFENesin ER (MUCINEX) 600 MG TABLET SR 12 HR; Take 1 Tablet by mouth every 12 hours for 14 days.    Based on history of presenting illness, review of systems and physical exam findings, most likely etiology of ear pain is viral URI; discussed symptomatic management with pharmacotherapy and over-the-counter medications.  On my exam I do not see any signs or symptoms consistent with a bacterial infection currently requiring oral antibiotics.  Discussed the risks the benefits and the indications of all new medications prescribed today.  Strict return and ED precautions were discussed and all questions were answered.      Differential diagnosis, natural history, supportive care, and indications for immediate follow-up discussed.    Advised the patient to follow-up with the primary care physician for recheck, reevaluation, and consideration of further management.    Please note that this dictation was created using voice recognition software. I have made reasonable attempt to correct obvious errors, but I expect that there are errors of grammar and possibly content that I did not discover before finalizing the note.    This note was electronically signed by Danii Valdivia MD PhD         [1] No Known Allergies